# Patient Record
Sex: FEMALE | Race: WHITE | Employment: FULL TIME
[De-identification: names, ages, dates, MRNs, and addresses within clinical notes are randomized per-mention and may not be internally consistent; named-entity substitution may affect disease eponyms.]

---

## 2017-01-05 ENCOUNTER — NURSE ONLY (OUTPATIENT)
Dept: OBGYN | Facility: CLINIC | Age: 27
End: 2017-01-05

## 2017-01-05 VITALS
BODY MASS INDEX: 19.14 KG/M2 | DIASTOLIC BLOOD PRESSURE: 84 MMHG | WEIGHT: 108 LBS | HEIGHT: 63 IN | SYSTOLIC BLOOD PRESSURE: 102 MMHG

## 2017-01-05 DIAGNOSIS — Z30.42 ENCOUNTER FOR SURVEILLANCE OF INJECTABLE CONTRACEPTIVE: Primary | ICD-10-CM

## 2017-01-05 PROCEDURE — 99211 OFF/OP EST MAY X REQ PHY/QHP: CPT | Performed by: OBSTETRICS & GYNECOLOGY

## 2017-01-05 RX ORDER — MEDROXYPROGESTERONE ACETATE 150 MG/ML
150 INJECTION, SUSPENSION INTRAMUSCULAR ONCE
Status: COMPLETED | OUTPATIENT
Start: 2017-01-05 | End: 2017-01-05

## 2017-01-05 RX ADMIN — MEDROXYPROGESTERONE ACETATE 150 MG: 150 INJECTION, SUSPENSION INTRAMUSCULAR at 10:13

## 2017-01-12 ENCOUNTER — TELEPHONE (OUTPATIENT)
Dept: OBGYN | Facility: CLINIC | Age: 27
End: 2017-01-12

## 2017-01-12 DIAGNOSIS — Z11.3 ROUTINE SCREENING FOR STI (SEXUALLY TRANSMITTED INFECTION): Primary | ICD-10-CM

## 2017-04-04 ENCOUNTER — OFFICE VISIT (OUTPATIENT)
Dept: OBGYN CLINIC | Age: 27
End: 2017-04-04
Payer: MEDICARE

## 2017-04-04 VITALS
BODY MASS INDEX: 18.23 KG/M2 | DIASTOLIC BLOOD PRESSURE: 72 MMHG | SYSTOLIC BLOOD PRESSURE: 100 MMHG | WEIGHT: 106.8 LBS | HEIGHT: 64 IN

## 2017-04-04 DIAGNOSIS — Z01.419 ENCOUNTER FOR GYNECOLOGICAL EXAMINATION WITHOUT ABNORMAL FINDING: Primary | ICD-10-CM

## 2017-04-04 DIAGNOSIS — Z30.42 ENCOUNTER FOR SURVEILLANCE OF INJECTABLE CONTRACEPTIVE: ICD-10-CM

## 2017-04-04 DIAGNOSIS — N92.1 MENOMETRORRHAGIA: ICD-10-CM

## 2017-04-04 DIAGNOSIS — R63.4 UNINTENDED WEIGHT LOSS: ICD-10-CM

## 2017-04-04 LAB — PAP SMEAR: ABNORMAL

## 2017-04-04 PROCEDURE — 99395 PREV VISIT EST AGE 18-39: CPT | Performed by: NURSE PRACTITIONER

## 2017-04-04 RX ORDER — MEDROXYPROGESTERONE ACETATE 150 MG/ML
150 INJECTION, SUSPENSION INTRAMUSCULAR ONCE
Status: DISCONTINUED | OUTPATIENT
Start: 2017-04-04 | End: 2018-02-01 | Stop reason: HOSPADM

## 2017-04-04 ASSESSMENT — ENCOUNTER SYMPTOMS
DIARRHEA: 0
SHORTNESS OF BREATH: 0
ABDOMINAL DISTENTION: 0
ABDOMINAL PAIN: 0
CONSTIPATION: 0
BACK PAIN: 0
COUGH: 0

## 2017-04-17 DIAGNOSIS — Z01.419 ENCOUNTER FOR GYNECOLOGICAL EXAMINATION WITHOUT ABNORMAL FINDING: ICD-10-CM

## 2017-05-18 ENCOUNTER — HOSPITAL ENCOUNTER (OUTPATIENT)
Age: 27
Setting detail: SPECIMEN
Discharge: HOME | End: 2017-05-18
Payer: MEDICARE

## 2017-05-18 ENCOUNTER — PROCEDURE VISIT (OUTPATIENT)
Dept: OBGYN CLINIC | Age: 27
End: 2017-05-18
Payer: MEDICARE

## 2017-05-18 VITALS
BODY MASS INDEX: 18.37 KG/M2 | DIASTOLIC BLOOD PRESSURE: 60 MMHG | HEIGHT: 64 IN | SYSTOLIC BLOOD PRESSURE: 108 MMHG | WEIGHT: 107.6 LBS

## 2017-05-18 DIAGNOSIS — N92.1 MENOMETRORRHAGIA: ICD-10-CM

## 2017-05-18 DIAGNOSIS — R87.612 LOW GRADE SQUAMOUS INTRAEPITH LESION ON CYTOLOGIC SMEAR CERVIX (LGSIL): Primary | ICD-10-CM

## 2017-05-18 DIAGNOSIS — R63.4 UNINTENDED WEIGHT LOSS: ICD-10-CM

## 2017-05-18 DIAGNOSIS — B97.7 HPV IN FEMALE: ICD-10-CM

## 2017-05-18 LAB
ABSOLUTE EOS #: 0.1 K/UL (ref 0–0.4)
ABSOLUTE LYMPH #: 2.9 K/UL (ref 1–4.8)
ABSOLUTE MONO #: 0.4 K/UL (ref 0.1–1.2)
BASOPHILS # BLD: 1 %
BASOPHILS ABSOLUTE: 0 K/UL (ref 0–0.2)
DIFFERENTIAL TYPE: NORMAL
EOSINOPHILS RELATIVE PERCENT: 2 %
HCT VFR BLD CALC: 40.6 % (ref 36–46)
HEMOGLOBIN: 13.7 G/DL (ref 12–16)
LYMPHOCYTES # BLD: 47 %
MCH RBC QN AUTO: 30.1 PG (ref 26–34)
MCHC RBC AUTO-ENTMCNC: 33.8 G/DL (ref 31–37)
MCV RBC AUTO: 89.2 FL (ref 80–100)
MONOCYTES # BLD: 7 %
PDW BLD-RTO: 13.7 % (ref 12.5–15.4)
PLATELET # BLD: 212 K/UL (ref 140–450)
PLATELET ESTIMATE: NORMAL
PMV BLD AUTO: 9.9 FL (ref 6–12)
RBC # BLD: 4.55 M/UL (ref 4–5.2)
RBC # BLD: NORMAL 10*6/UL
SEG NEUTROPHILS: 43 %
SEGMENTED NEUTROPHILS ABSOLUTE COUNT: 2.5 K/UL (ref 1.8–7.7)
TSH SERPL DL<=0.05 MIU/L-ACNC: 1.33 MIU/L (ref 0.3–5)
WBC # BLD: 5.9 K/UL (ref 3.5–11)
WBC # BLD: NORMAL 10*3/UL

## 2017-05-18 PROCEDURE — 57454 BX/CURETT OF CERVIX W/SCOPE: CPT | Performed by: OBSTETRICS & GYNECOLOGY

## 2017-05-18 RX ORDER — MEDROXYPROGESTERONE ACETATE 150 MG/ML
150 INJECTION, SUSPENSION INTRAMUSCULAR
COMMUNITY
End: 2017-10-04 | Stop reason: ALTCHOICE

## 2017-05-22 LAB — SURGICAL PATHOLOGY REPORT: NORMAL

## 2017-06-05 ENCOUNTER — OFFICE VISIT (OUTPATIENT)
Dept: OBGYN CLINIC | Age: 27
End: 2017-06-05
Payer: MEDICARE

## 2017-06-05 VITALS
SYSTOLIC BLOOD PRESSURE: 98 MMHG | WEIGHT: 108 LBS | DIASTOLIC BLOOD PRESSURE: 60 MMHG | BODY MASS INDEX: 18.44 KG/M2 | HEIGHT: 64 IN

## 2017-06-05 DIAGNOSIS — N92.1 MENOMETRORRHAGIA: ICD-10-CM

## 2017-06-05 DIAGNOSIS — N87.0 MILD DYSPLASIA OF CERVIX: Primary | ICD-10-CM

## 2017-06-05 PROCEDURE — 99213 OFFICE O/P EST LOW 20 MIN: CPT | Performed by: OBSTETRICS & GYNECOLOGY

## 2017-07-13 ENCOUNTER — PROCEDURE VISIT (OUTPATIENT)
Dept: OBGYN CLINIC | Age: 27
End: 2017-07-13
Payer: MEDICARE

## 2017-07-13 DIAGNOSIS — N92.1 MENOMETRORRHAGIA: ICD-10-CM

## 2017-07-13 DIAGNOSIS — N92.1 MENOMETRORRHAGIA: Primary | ICD-10-CM

## 2017-07-13 PROCEDURE — 99212 OFFICE O/P EST SF 10 MIN: CPT | Performed by: OBSTETRICS & GYNECOLOGY

## 2017-07-13 PROCEDURE — 76830 TRANSVAGINAL US NON-OB: CPT | Performed by: OBSTETRICS & GYNECOLOGY

## 2017-07-17 ENCOUNTER — OFFICE VISIT (OUTPATIENT)
Dept: OBGYN CLINIC | Age: 27
End: 2017-07-17
Payer: MEDICARE

## 2017-07-17 VITALS
SYSTOLIC BLOOD PRESSURE: 100 MMHG | WEIGHT: 106.6 LBS | HEIGHT: 64 IN | BODY MASS INDEX: 18.2 KG/M2 | DIASTOLIC BLOOD PRESSURE: 58 MMHG

## 2017-07-17 DIAGNOSIS — N92.1 MENOMETRORRHAGIA: Primary | ICD-10-CM

## 2017-07-17 PROCEDURE — 99213 OFFICE O/P EST LOW 20 MIN: CPT | Performed by: OBSTETRICS & GYNECOLOGY

## 2017-08-04 ENCOUNTER — TELEPHONE (OUTPATIENT)
Dept: OBGYN CLINIC | Age: 27
End: 2017-08-04

## 2017-08-22 ENCOUNTER — TELEPHONE (OUTPATIENT)
Dept: OBGYN CLINIC | Age: 27
End: 2017-08-22

## 2017-08-24 ENCOUNTER — INITIAL CONSULT (OUTPATIENT)
Dept: OBGYN CLINIC | Age: 27
End: 2017-08-24
Payer: MEDICARE

## 2017-08-24 VITALS
WEIGHT: 109 LBS | HEIGHT: 64 IN | DIASTOLIC BLOOD PRESSURE: 72 MMHG | SYSTOLIC BLOOD PRESSURE: 110 MMHG | BODY MASS INDEX: 18.61 KG/M2

## 2017-08-24 DIAGNOSIS — N92.1 MENOMETRORRHAGIA: Primary | ICD-10-CM

## 2017-08-24 DIAGNOSIS — Z09 POSTOP CHECK: ICD-10-CM

## 2017-08-24 DIAGNOSIS — N80.30 ENDOMETRIOSIS OF PELVIC PERITONEUM: ICD-10-CM

## 2017-08-24 PROCEDURE — 99214 OFFICE O/P EST MOD 30 MIN: CPT | Performed by: OBSTETRICS & GYNECOLOGY

## 2017-10-04 ENCOUNTER — OFFICE VISIT (OUTPATIENT)
Dept: FAMILY MEDICINE CLINIC | Age: 27
End: 2017-10-04
Payer: MEDICARE

## 2017-10-04 VITALS
HEIGHT: 63 IN | OXYGEN SATURATION: 98 % | RESPIRATION RATE: 18 BRPM | HEART RATE: 82 BPM | BODY MASS INDEX: 18.96 KG/M2 | TEMPERATURE: 97.1 F | DIASTOLIC BLOOD PRESSURE: 62 MMHG | SYSTOLIC BLOOD PRESSURE: 118 MMHG | WEIGHT: 107 LBS

## 2017-10-04 DIAGNOSIS — Z00.00 WELL ADULT EXAM: Primary | ICD-10-CM

## 2017-10-04 DIAGNOSIS — Z23 IMMUNIZATION DUE: ICD-10-CM

## 2017-10-04 DIAGNOSIS — Z11.1 PPD SCREENING TEST: ICD-10-CM

## 2017-10-04 PROCEDURE — 86580 TB INTRADERMAL TEST: CPT | Performed by: INTERNAL MEDICINE

## 2017-10-04 PROCEDURE — 90471 IMMUNIZATION ADMIN: CPT | Performed by: INTERNAL MEDICINE

## 2017-10-04 PROCEDURE — 99395 PREV VISIT EST AGE 18-39: CPT | Performed by: INTERNAL MEDICINE

## 2017-10-04 PROCEDURE — 90686 IIV4 VACC NO PRSV 0.5 ML IM: CPT | Performed by: INTERNAL MEDICINE

## 2017-10-04 ASSESSMENT — ENCOUNTER SYMPTOMS
ABDOMINAL PAIN: 0
CONSTIPATION: 0
COUGH: 0
EYE REDNESS: 0
CHEST TIGHTNESS: 0
SHORTNESS OF BREATH: 0
SORE THROAT: 0
DIARRHEA: 0
APNEA: 0
VOMITING: 0
NAUSEA: 0
BLOOD IN STOOL: 0

## 2017-10-04 ASSESSMENT — PATIENT HEALTH QUESTIONNAIRE - PHQ9
2. FEELING DOWN, DEPRESSED OR HOPELESS: 0
SUM OF ALL RESPONSES TO PHQ9 QUESTIONS 1 & 2: 0
1. LITTLE INTEREST OR PLEASURE IN DOING THINGS: 0
SUM OF ALL RESPONSES TO PHQ QUESTIONS 1-9: 0

## 2017-10-04 NOTE — PROGRESS NOTES
Visit Information    Have you changed or started any medications since your last visit including any over-the-counter medicines, vitamins, or herbal medicines? no   Are you having any side effects from any of your medications? -  no  Have you stopped taking any of your medications? Is so, why? -  no    Have you seen any other physician or provider since your last visit? No  Have you had any other diagnostic tests since your last visit? No  Have you been seen in the emergency room and/or had an admission to a hospital since we last saw you? No  Have you had your routine dental cleaning in the past 6 months? no    Have you activated your [employer] account? If not, what are your barriers?  No: Discussed with patient     No care team member to display    Medical History Review  Past Medical, Family, and Social History reviewed and does not contribute to the patient presenting condition    Health Maintenance   Topic Date Due    DTaP/Tdap/Td vaccine (1 - Tdap) 07/18/2009    Flu vaccine (1) 09/01/2017    Cervical cancer screen  04/04/2020    HIV screen  Completed
Available lab work, tests and notes were discussed. Health maintenance was discussed. Diet, exercise, reduction in weight and salt was discussed. Range of motion exercises were discussed. Discussed use, benefit, and side effects of prescribed medications. All patient questions answered. Pt voiced understanding. Instructed to continue current medications, diet and exercise. Patient agreed with treatment plan. Follow up as directed. Patient given educational materials - see patient instructions.     Electronically signed by Luis Talley DO on 10/4/2017 at 8:24 AM

## 2017-12-21 ENCOUNTER — OFFICE VISIT (OUTPATIENT)
Dept: OBGYN CLINIC | Age: 27
End: 2017-12-21
Payer: MEDICARE

## 2017-12-21 VITALS
WEIGHT: 108 LBS | DIASTOLIC BLOOD PRESSURE: 70 MMHG | HEIGHT: 64 IN | BODY MASS INDEX: 18.44 KG/M2 | SYSTOLIC BLOOD PRESSURE: 110 MMHG

## 2017-12-21 DIAGNOSIS — N92.1 MENOMETRORRHAGIA: Primary | ICD-10-CM

## 2017-12-21 DIAGNOSIS — D25.9 UTERINE LEIOMYOMA, UNSPECIFIED LOCATION: ICD-10-CM

## 2017-12-21 DIAGNOSIS — R10.2 CHRONIC PELVIC PAIN IN FEMALE: ICD-10-CM

## 2017-12-21 DIAGNOSIS — G89.29 CHRONIC PELVIC PAIN IN FEMALE: ICD-10-CM

## 2017-12-21 PROCEDURE — G8427 DOCREV CUR MEDS BY ELIG CLIN: HCPCS | Performed by: OBSTETRICS & GYNECOLOGY

## 2017-12-21 PROCEDURE — G8484 FLU IMMUNIZE NO ADMIN: HCPCS | Performed by: OBSTETRICS & GYNECOLOGY

## 2017-12-21 PROCEDURE — 1036F TOBACCO NON-USER: CPT | Performed by: OBSTETRICS & GYNECOLOGY

## 2017-12-21 PROCEDURE — G8420 CALC BMI NORM PARAMETERS: HCPCS | Performed by: OBSTETRICS & GYNECOLOGY

## 2017-12-21 PROCEDURE — 99214 OFFICE O/P EST MOD 30 MIN: CPT | Performed by: OBSTETRICS & GYNECOLOGY

## 2017-12-21 RX ORDER — ACETAMINOPHEN AND CODEINE PHOSPHATE 300; 30 MG/1; MG/1
1-2 TABLET ORAL EVERY 6 HOURS PRN
Qty: 20 TABLET | Refills: 0 | Status: ON HOLD | OUTPATIENT
Start: 2017-12-21 | End: 2018-02-01

## 2017-12-21 NOTE — PATIENT INSTRUCTIONS
Please carefully follow all the preoperative instructions given to you. Remember not to take ibuprofen, aspirin or related products within 5 days of your scheduled surgery. Please call the office if you have any questions or concerns prior to surgery. Plan on returning to the office 1-2 weeks after hospital discharge. Bandar Skaggs and Happy New Year!

## 2017-12-21 NOTE — PROGRESS NOTES
Past Surgical History:   Procedure Laterality Date    COLPOSCOPY  2017    LORA I    SALPINGECTOMY Bilateral 2017    partial with D+C, hysteroscopy, failed NovaSure ablation     Family History   Problem Relation Age of Onset    Other Mother      problems with irreg cycles    Heart Disease Father     Diabetes Maternal Grandmother      History   Smoking Status    Former Smoker   Smokeless Tobacco    Never Used     History   Alcohol Use No     Current Outpatient Prescriptions   Medication Sig Dispense Refill    acetaminophen-codeine (TYLENOL #3) 300-30 MG per tablet Take 1-2 tablets by mouth every 6 hours as needed for Pain . 20 tablet 0     Current Facility-Administered Medications   Medication Dose Route Frequency Provider Last Rate Last Dose    medroxyPROGESTERone (DEPO-PROVERA) injection 150 mg  150 mg Intramuscular Once Penny Bauman CNP           Allergies: Allergies   Allergen Reactions    Morphine Other (See Comments)     phlebitis       Gynecologic History:  No LMP recorded. Patient is not currently having periods (Reason: Other - See Notes).   Sexually Active: Yes  STD History: Yes, HPV  Abnormal Pap History yes/LGSIL  Birth Control: Yes, BS    Obstetric History       T0      L2     SAB0   TAB0   Ectopic0   Molar0   Multiple0   Live Births2      ______________________________________________________________________  REVIEW OF SYSTEMS:        Constitutional:  Unexpected weight change no  Neurological:  Frequent headaches  no  Ophthalmic:  Recent visual changes no  ENT:   Difficulty swallowing  no  Breast:              Masses   no     Respiratory:  Shortness of breath  no    Cardiovascular: Chest pain   yes     Gastrointestinal: Chronic diarrhea/constipation no   Urogenital:  Urinary incontinence  no                                         Heavy/irregular periods           yes                                      Vaginal discharge to the proposed procedure including but not limited to: infection, hemorrhage, blood clot formation, damage to the gastrointestinal/genital urinary/neurological/vascular systems, death and failure in the proposed procedure's intent. She also understands the risks from transfusion including but not limited to: fever, allergic reaction, hepatitis B/C. and HIV. All her questions have been answered to her satisfaction. The consent has been signed for a vaginal, possible abdominal hysterectomy. Preop labs will include a CBC, type & screen, HCG and BMP. We will use Ancef IVPB for antibiotic prophylaxis and Lovenox 40 mg subcutaneous for VTE prophylaxis. She was instructed not to use NSAIDs regularly within 4-5 days of her planned surgery. We'll manage postoperative pain orally with Tylenol and codeine which she states is better tolerated. She will plan on returning to the office in 1-2 weeks postop. Re Miller MD, MPH, [facility]. [facility] [place]    Patient was seen with total face to face time of 20 minutes.

## 2018-01-23 ENCOUNTER — HOSPITAL ENCOUNTER (OUTPATIENT)
Dept: PREADMISSION TESTING | Age: 28
Discharge: HOME | End: 2018-01-23
Payer: MEDICARE

## 2018-01-23 VITALS
SYSTOLIC BLOOD PRESSURE: 104 MMHG | WEIGHT: 108.03 LBS | RESPIRATION RATE: 12 BRPM | DIASTOLIC BLOOD PRESSURE: 64 MMHG | OXYGEN SATURATION: 96 % | HEART RATE: 62 BPM | BODY MASS INDEX: 18 KG/M2 | TEMPERATURE: 98.3 F | HEIGHT: 65 IN

## 2018-01-23 LAB
ANION GAP SERPL CALCULATED.3IONS-SCNC: 14 MMOL/L (ref 9–17)
BUN BLDV-MCNC: 8 MG/DL (ref 6–20)
BUN/CREAT BLD: ABNORMAL (ref 9–20)
CALCIUM SERPL-MCNC: 9.1 MG/DL (ref 8.6–10.4)
CHLORIDE BLD-SCNC: 99 MMOL/L (ref 98–107)
CO2: 27 MMOL/L (ref 20–31)
CREAT SERPL-MCNC: 0.52 MG/DL (ref 0.5–0.9)
GFR AFRICAN AMERICAN: >60 ML/MIN
GFR NON-AFRICAN AMERICAN: >60 ML/MIN
GFR SERPL CREATININE-BSD FRML MDRD: ABNORMAL ML/MIN/{1.73_M2}
GFR SERPL CREATININE-BSD FRML MDRD: ABNORMAL ML/MIN/{1.73_M2}
GLUCOSE BLD-MCNC: 100 MG/DL (ref 70–99)
HCG QUALITATIVE: NEGATIVE
HCT VFR BLD CALC: 42.4 % (ref 36.3–47.1)
HEMOGLOBIN: 14 G/DL (ref 11.9–15.1)
MCH RBC QN AUTO: 29.9 PG (ref 25.2–33.5)
MCHC RBC AUTO-ENTMCNC: 33 G/DL (ref 28.4–34.8)
MCV RBC AUTO: 90.6 FL (ref 82.6–102.9)
NRBC AUTOMATED: 0 PER 100 WBC
PDW BLD-RTO: 12.7 % (ref 11.8–14.4)
PLATELET # BLD: 197 K/UL (ref 138–453)
PMV BLD AUTO: 11.3 FL (ref 8.1–13.5)
POTASSIUM SERPL-SCNC: 3.7 MMOL/L (ref 3.7–5.3)
RBC # BLD: 4.68 M/UL (ref 3.95–5.11)
SODIUM BLD-SCNC: 140 MMOL/L (ref 135–144)
WBC # BLD: 3.1 K/UL (ref 3.5–11.3)

## 2018-01-23 PROCEDURE — 86850 RBC ANTIBODY SCREEN: CPT

## 2018-01-23 PROCEDURE — 36415 COLL VENOUS BLD VENIPUNCTURE: CPT

## 2018-01-23 PROCEDURE — 85027 COMPLETE CBC AUTOMATED: CPT

## 2018-01-23 PROCEDURE — 80048 BASIC METABOLIC PNL TOTAL CA: CPT

## 2018-01-23 PROCEDURE — 84703 CHORIONIC GONADOTROPIN ASSAY: CPT

## 2018-01-23 PROCEDURE — 86901 BLOOD TYPING SEROLOGIC RH(D): CPT

## 2018-01-23 PROCEDURE — 86900 BLOOD TYPING SEROLOGIC ABO: CPT

## 2018-01-23 RX ORDER — OSELTAMIVIR PHOSPHATE 30 MG/1
30 CAPSULE ORAL 2 TIMES DAILY
Status: ON HOLD | COMMUNITY
End: 2018-02-01 | Stop reason: HOSPADM

## 2018-01-23 RX ORDER — SODIUM CHLORIDE, SODIUM LACTATE, POTASSIUM CHLORIDE, CALCIUM CHLORIDE 600; 310; 30; 20 MG/100ML; MG/100ML; MG/100ML; MG/100ML
1000 INJECTION, SOLUTION INTRAVENOUS CONTINUOUS
Status: CANCELLED | OUTPATIENT
Start: 2018-01-23

## 2018-01-23 NOTE — PROGRESS NOTES
Anesthesia Focused Assessment    STOP-BANG Sleep Apnea Questionnaire    SNORE loudly (heard through closed doors)? No  TIRED, fatigued, sleepy during daytime? No  OBSERVED stopping breathing during sleep? No  High blood PRESSURE being treated? No    BMI over 35? No  AGE over 48? No  NECK circumference over 16\"? No  GENDER (male)? No             Total 0  High risk 5-8  Intermediate risk 3-4  Low risk 0-2    Obstructive Sleep Apnea: no  If YES, machine used: no     Type 1 DM:   no  T2DM:  no    Coronary Artery Disease:  no  Hypertension:  no    Active smoker:  Less than 1/2 ppd for 10 years, quit 2016. Drinks Alcohol:  no    Dentition: benign    Defib / AICD / Pacemaker: no      Renal Failure/dialysis:  no    Patient was evaluated in PAT & anesthesia guidelines were applied. NPO guidelines, medication instructions and scheduled arrival time were reviewed with patient. Hx of anesthesia complications:  PONV;  Prolonged emergence from general anesthesia. Anxious-requests versed. Family hx of anesthesia complications:  no                                                                                                                     Anesthesia contacted:   Yes (patient was recommended to follow up with cardiology for Holter Monitor but did not yet. No clearance requested per anesthesia-Dr. Thien Arellano). Medical or cardiac clearance ordered: patient saw cardiology while inpatient 8/2017, syncope and bradycardia (testing in epic and paper chart). Pulse was 62 at PAT appointment today. PATIENT CURRENTLY IS BEING TREATED FOR INFLUENZA WITH TAMIFLU, DAY 4. SHE DOES NOT HAVE A PCP, TREATED BY URGENT CARE. PATIENT WAS TOLD THAT SHE SHOULD CALL HER SURGEON Monday IF SHE IS NOT FEELING BACK TO NORMAL FOR HER SURGERY ON Wednesday. PATIENT UNDERSTANDS AND AGREES THAT SHE WILL.     Shahid Dan PA-C  1/23/18  11:33 AM

## 2018-01-29 ENCOUNTER — TELEPHONE (OUTPATIENT)
Dept: OBGYN CLINIC | Age: 28
End: 2018-01-29

## 2018-01-29 NOTE — TELEPHONE ENCOUNTER
[facility] and asked how she was doing she stated is few feels great no symptoms so she is still planning on having surgery on Wednesday

## 2018-01-30 ENCOUNTER — ANESTHESIA EVENT (OUTPATIENT)
Dept: OPERATING ROOM | Age: 28
End: 2018-01-30
Payer: MEDICARE

## 2018-01-30 PROBLEM — Z87.42 HISTORY OF ENDOMETRIOSIS: Status: ACTIVE | Noted: 2018-01-30

## 2018-01-30 PROBLEM — Z87.42 H/O ABNORMAL CERVICAL PAPANICOLAOU SMEAR: Status: ACTIVE | Noted: 2018-01-30

## 2018-01-30 PROBLEM — D25.9 UTERINE FIBROID: Status: ACTIVE | Noted: 2018-01-30

## 2018-01-30 PROBLEM — N92.0 MENORRHAGIA: Status: ACTIVE | Noted: 2018-01-30

## 2018-01-30 PROBLEM — R00.1 BRADYCARDIA: Status: ACTIVE | Noted: 2018-01-30

## 2018-01-30 PROBLEM — Z98.51 HISTORY OF BILATERAL TUBAL LIGATION: Status: ACTIVE | Noted: 2018-01-30

## 2018-01-30 PROBLEM — Z98.890 HISTORY OF ENDOMETRIAL ABLATION: Status: ACTIVE | Noted: 2018-01-30

## 2018-01-30 PROBLEM — M54.9 CHRONIC BACK PAIN: Status: ACTIVE | Noted: 2018-01-30

## 2018-01-30 PROBLEM — N92.1 MENOMETRORRHAGIA: Status: ACTIVE | Noted: 2018-01-30

## 2018-01-30 PROBLEM — R10.2 PELVIC PAIN IN FEMALE: Status: ACTIVE | Noted: 2018-01-30

## 2018-01-30 PROBLEM — G89.29 CHRONIC BACK PAIN: Status: ACTIVE | Noted: 2018-01-30

## 2018-01-30 PROBLEM — Z87.410 HISTORY OF CERVICAL DYSPLASIA: Status: ACTIVE | Noted: 2018-01-30

## 2018-01-31 ENCOUNTER — HOSPITAL ENCOUNTER (OUTPATIENT)
Age: 28
Setting detail: OBSERVATION
Discharge: HOME | End: 2018-02-01
Attending: OBSTETRICS & GYNECOLOGY | Admitting: OBSTETRICS & GYNECOLOGY
Payer: MEDICARE

## 2018-01-31 ENCOUNTER — ANESTHESIA (OUTPATIENT)
Dept: OPERATING ROOM | Age: 28
End: 2018-01-31
Payer: MEDICARE

## 2018-01-31 VITALS
OXYGEN SATURATION: 100 % | TEMPERATURE: 96.1 F | SYSTOLIC BLOOD PRESSURE: 92 MMHG | RESPIRATION RATE: 7 BRPM | DIASTOLIC BLOOD PRESSURE: 61 MMHG

## 2018-01-31 DIAGNOSIS — Z90.710 S/P TOTAL HYSTERECTOMY: Primary | ICD-10-CM

## 2018-01-31 DIAGNOSIS — G89.18 POSTOPERATIVE PAIN: ICD-10-CM

## 2018-01-31 LAB
ABO/RH: NORMAL
ANTIBODY SCREEN: NEGATIVE
ARM BAND NUMBER: NORMAL
EXPIRATION DATE: NORMAL

## 2018-01-31 PROCEDURE — 3700000000 HC ANESTHESIA ATTENDED CARE: Performed by: OBSTETRICS & GYNECOLOGY

## 2018-01-31 PROCEDURE — 96372 THER/PROPH/DIAG INJ SC/IM: CPT

## 2018-01-31 PROCEDURE — 6360000002 HC RX W HCPCS: Performed by: OBSTETRICS & GYNECOLOGY

## 2018-01-31 PROCEDURE — 2580000003 HC RX 258: Performed by: ANESTHESIOLOGY

## 2018-01-31 PROCEDURE — 6370000000 HC RX 637 (ALT 250 FOR IP): Performed by: OBSTETRICS & GYNECOLOGY

## 2018-01-31 PROCEDURE — 2500000003 HC RX 250 WO HCPCS: Performed by: NURSE ANESTHETIST, CERTIFIED REGISTERED

## 2018-01-31 PROCEDURE — 96374 THER/PROPH/DIAG INJ IV PUSH: CPT

## 2018-01-31 PROCEDURE — 2780000010 HC IMPLANT OTHER: Performed by: OBSTETRICS & GYNECOLOGY

## 2018-01-31 PROCEDURE — 6360000002 HC RX W HCPCS: Performed by: NURSE ANESTHETIST, CERTIFIED REGISTERED

## 2018-01-31 PROCEDURE — 87086 URINE CULTURE/COLONY COUNT: CPT

## 2018-01-31 PROCEDURE — 94762 N-INVAS EAR/PLS OXIMTRY CONT: CPT

## 2018-01-31 PROCEDURE — 6360000002 HC RX W HCPCS

## 2018-01-31 PROCEDURE — 3600000014 HC SURGERY LEVEL 4 ADDTL 15MIN: Performed by: OBSTETRICS & GYNECOLOGY

## 2018-01-31 PROCEDURE — 3700000001 HC ADD 15 MINUTES (ANESTHESIA): Performed by: OBSTETRICS & GYNECOLOGY

## 2018-01-31 PROCEDURE — 2580000003 HC RX 258: Performed by: OBSTETRICS & GYNECOLOGY

## 2018-01-31 PROCEDURE — 96375 TX/PRO/DX INJ NEW DRUG ADDON: CPT

## 2018-01-31 PROCEDURE — 6370000000 HC RX 637 (ALT 250 FOR IP): Performed by: ANESTHESIOLOGY

## 2018-01-31 PROCEDURE — 7100000001 HC PACU RECOVERY - ADDTL 15 MIN: Performed by: OBSTETRICS & GYNECOLOGY

## 2018-01-31 PROCEDURE — 58260 VAGINAL HYSTERECTOMY: CPT | Performed by: OBSTETRICS & GYNECOLOGY

## 2018-01-31 PROCEDURE — 64488 TAP BLOCK BI INJECTION: CPT | Performed by: ANESTHESIOLOGY

## 2018-01-31 PROCEDURE — 6360000002 HC RX W HCPCS: Performed by: ANESTHESIOLOGY

## 2018-01-31 PROCEDURE — 88307 TISSUE EXAM BY PATHOLOGIST: CPT

## 2018-01-31 PROCEDURE — G0378 HOSPITAL OBSERVATION PER HR: HCPCS

## 2018-01-31 PROCEDURE — 2500000003 HC RX 250 WO HCPCS

## 2018-01-31 PROCEDURE — 96376 TX/PRO/DX INJ SAME DRUG ADON: CPT

## 2018-01-31 PROCEDURE — 7100000000 HC PACU RECOVERY - FIRST 15 MIN: Performed by: OBSTETRICS & GYNECOLOGY

## 2018-01-31 PROCEDURE — 3600000004 HC SURGERY LEVEL 4 BASE: Performed by: OBSTETRICS & GYNECOLOGY

## 2018-01-31 DEVICE — AGENT HEMSTAT 3GM OXIDIZED REGENERATED CELOS ABSRB FOR CONT (ORDER MULTIPLES OF 5EA): Type: IMPLANTABLE DEVICE | Status: FUNCTIONAL

## 2018-01-31 RX ORDER — MIDAZOLAM HYDROCHLORIDE 1 MG/ML
2 INJECTION INTRAMUSCULAR; INTRAVENOUS ONCE
Status: COMPLETED | OUTPATIENT
Start: 2018-01-31 | End: 2018-01-31

## 2018-01-31 RX ORDER — ROCURONIUM BROMIDE 10 MG/ML
INJECTION, SOLUTION INTRAVENOUS PRN
Status: DISCONTINUED | OUTPATIENT
Start: 2018-01-31 | End: 2018-01-31 | Stop reason: SDUPTHER

## 2018-01-31 RX ORDER — KETOROLAC TROMETHAMINE 30 MG/ML
INJECTION, SOLUTION INTRAMUSCULAR; INTRAVENOUS PRN
Status: DISCONTINUED | OUTPATIENT
Start: 2018-01-31 | End: 2018-01-31 | Stop reason: SDUPTHER

## 2018-01-31 RX ORDER — PROMETHAZINE HYDROCHLORIDE 25 MG/ML
6.25 INJECTION, SOLUTION INTRAMUSCULAR; INTRAVENOUS
Status: DISCONTINUED | OUTPATIENT
Start: 2018-01-31 | End: 2018-01-31 | Stop reason: HOSPADM

## 2018-01-31 RX ORDER — NEOSTIGMINE METHYLSULFATE 1 MG/ML
INJECTION, SOLUTION INTRAVENOUS PRN
Status: DISCONTINUED | OUTPATIENT
Start: 2018-01-31 | End: 2018-01-31 | Stop reason: SDUPTHER

## 2018-01-31 RX ORDER — DIPHENHYDRAMINE HYDROCHLORIDE 50 MG/ML
INJECTION INTRAMUSCULAR; INTRAVENOUS PRN
Status: DISCONTINUED | OUTPATIENT
Start: 2018-01-31 | End: 2018-01-31 | Stop reason: SDUPTHER

## 2018-01-31 RX ORDER — LIDOCAINE HYDROCHLORIDE 10 MG/ML
1 INJECTION, SOLUTION EPIDURAL; INFILTRATION; INTRACAUDAL; PERINEURAL
Status: DISCONTINUED | OUTPATIENT
Start: 2018-01-31 | End: 2018-01-31 | Stop reason: HOSPADM

## 2018-01-31 RX ORDER — ACETAMINOPHEN AND CODEINE PHOSPHATE 300; 30 MG/1; MG/1
2 TABLET ORAL EVERY 6 HOURS PRN
Status: DISCONTINUED | OUTPATIENT
Start: 2018-01-31 | End: 2018-02-01 | Stop reason: HOSPADM

## 2018-01-31 RX ORDER — SCOLOPAMINE TRANSDERMAL SYSTEM 1 MG/1
1 PATCH, EXTENDED RELEASE TRANSDERMAL ONCE
Status: DISCONTINUED | OUTPATIENT
Start: 2018-01-31 | End: 2018-02-01 | Stop reason: HOSPADM

## 2018-01-31 RX ORDER — ONDANSETRON 2 MG/ML
4 INJECTION INTRAMUSCULAR; INTRAVENOUS EVERY 6 HOURS PRN
Status: DISCONTINUED | OUTPATIENT
Start: 2018-01-31 | End: 2018-02-01 | Stop reason: HOSPADM

## 2018-01-31 RX ORDER — FENTANYL CITRATE 50 UG/ML
INJECTION, SOLUTION INTRAMUSCULAR; INTRAVENOUS PRN
Status: DISCONTINUED | OUTPATIENT
Start: 2018-01-31 | End: 2018-01-31 | Stop reason: SDUPTHER

## 2018-01-31 RX ORDER — APREPITANT 40 MG/1
40 CAPSULE ORAL ONCE
Status: COMPLETED | OUTPATIENT
Start: 2018-01-31 | End: 2018-01-31

## 2018-01-31 RX ORDER — SODIUM CHLORIDE, SODIUM LACTATE, POTASSIUM CHLORIDE, CALCIUM CHLORIDE 600; 310; 30; 20 MG/100ML; MG/100ML; MG/100ML; MG/100ML
INJECTION, SOLUTION INTRAVENOUS CONTINUOUS
Status: DISCONTINUED | OUTPATIENT
Start: 2018-01-31 | End: 2018-01-31

## 2018-01-31 RX ORDER — FENTANYL CITRATE 50 UG/ML
INJECTION, SOLUTION INTRAMUSCULAR; INTRAVENOUS
Status: COMPLETED
Start: 2018-01-31 | End: 2018-01-31

## 2018-01-31 RX ORDER — PROMETHAZINE HYDROCHLORIDE 25 MG/ML
6.25 INJECTION, SOLUTION INTRAMUSCULAR; INTRAVENOUS EVERY 6 HOURS PRN
Status: DISCONTINUED | OUTPATIENT
Start: 2018-01-31 | End: 2018-02-01 | Stop reason: HOSPADM

## 2018-01-31 RX ORDER — FENTANYL CITRATE 50 UG/ML
100 INJECTION, SOLUTION INTRAMUSCULAR; INTRAVENOUS ONCE
Status: COMPLETED | OUTPATIENT
Start: 2018-01-31 | End: 2018-01-31

## 2018-01-31 RX ORDER — SODIUM CHLORIDE 9 MG/ML
INJECTION, SOLUTION INTRAVENOUS CONTINUOUS
Status: DISCONTINUED | OUTPATIENT
Start: 2018-01-31 | End: 2018-02-01

## 2018-01-31 RX ORDER — ONDANSETRON 2 MG/ML
INJECTION INTRAMUSCULAR; INTRAVENOUS PRN
Status: DISCONTINUED | OUTPATIENT
Start: 2018-01-31 | End: 2018-01-31 | Stop reason: SDUPTHER

## 2018-01-31 RX ORDER — KETOROLAC TROMETHAMINE 30 MG/ML
30 INJECTION, SOLUTION INTRAMUSCULAR; INTRAVENOUS EVERY 6 HOURS PRN
Status: DISCONTINUED | OUTPATIENT
Start: 2018-01-31 | End: 2018-02-01 | Stop reason: HOSPADM

## 2018-01-31 RX ORDER — LIDOCAINE HYDROCHLORIDE 10 MG/ML
INJECTION, SOLUTION EPIDURAL; INFILTRATION; INTRACAUDAL; PERINEURAL PRN
Status: DISCONTINUED | OUTPATIENT
Start: 2018-01-31 | End: 2018-01-31 | Stop reason: SDUPTHER

## 2018-01-31 RX ORDER — ACETAMINOPHEN AND CODEINE PHOSPHATE 300; 30 MG/1; MG/1
1 TABLET ORAL EVERY 6 HOURS PRN
Status: DISCONTINUED | OUTPATIENT
Start: 2018-01-31 | End: 2018-02-01 | Stop reason: HOSPADM

## 2018-01-31 RX ORDER — SODIUM CHLORIDE 0.9 % (FLUSH) 0.9 %
10 SYRINGE (ML) INJECTION PRN
Status: DISCONTINUED | OUTPATIENT
Start: 2018-01-31 | End: 2018-02-01 | Stop reason: HOSPADM

## 2018-01-31 RX ORDER — SODIUM CHLORIDE, SODIUM LACTATE, POTASSIUM CHLORIDE, CALCIUM CHLORIDE 600; 310; 30; 20 MG/100ML; MG/100ML; MG/100ML; MG/100ML
1000 INJECTION, SOLUTION INTRAVENOUS CONTINUOUS
Status: DISCONTINUED | OUTPATIENT
Start: 2018-01-31 | End: 2018-01-31

## 2018-01-31 RX ORDER — DOCUSATE SODIUM 100 MG/1
100 CAPSULE, LIQUID FILLED ORAL 2 TIMES DAILY
Status: DISCONTINUED | OUTPATIENT
Start: 2018-01-31 | End: 2018-02-01 | Stop reason: HOSPADM

## 2018-01-31 RX ORDER — DIPHENHYDRAMINE HYDROCHLORIDE 50 MG/ML
12.5 INJECTION INTRAMUSCULAR; INTRAVENOUS
Status: DISCONTINUED | OUTPATIENT
Start: 2018-01-31 | End: 2018-01-31 | Stop reason: HOSPADM

## 2018-01-31 RX ORDER — MAGNESIUM HYDROXIDE 1200 MG/15ML
LIQUID ORAL CONTINUOUS PRN
Status: DISCONTINUED | OUTPATIENT
Start: 2018-01-31 | End: 2018-01-31 | Stop reason: HOSPADM

## 2018-01-31 RX ORDER — PROMETHAZINE HYDROCHLORIDE 25 MG/ML
12.5 INJECTION, SOLUTION INTRAMUSCULAR; INTRAVENOUS ONCE
Status: COMPLETED | OUTPATIENT
Start: 2018-01-31 | End: 2018-01-31

## 2018-01-31 RX ORDER — DEXAMETHASONE SODIUM PHOSPHATE 10 MG/ML
INJECTION INTRAMUSCULAR; INTRAVENOUS PRN
Status: DISCONTINUED | OUTPATIENT
Start: 2018-01-31 | End: 2018-01-31 | Stop reason: SDUPTHER

## 2018-01-31 RX ORDER — ONDANSETRON 4 MG/1
4 TABLET, FILM COATED ORAL EVERY 8 HOURS PRN
Status: DISCONTINUED | OUTPATIENT
Start: 2018-01-31 | End: 2018-02-01 | Stop reason: HOSPADM

## 2018-01-31 RX ORDER — GLYCOPYRROLATE 0.2 MG/ML
INJECTION INTRAMUSCULAR; INTRAVENOUS PRN
Status: DISCONTINUED | OUTPATIENT
Start: 2018-01-31 | End: 2018-01-31 | Stop reason: SDUPTHER

## 2018-01-31 RX ORDER — SIMETHICONE 80 MG
80 TABLET,CHEWABLE ORAL EVERY 6 HOURS PRN
Status: DISCONTINUED | OUTPATIENT
Start: 2018-01-31 | End: 2018-02-01 | Stop reason: HOSPADM

## 2018-01-31 RX ORDER — PROPOFOL 10 MG/ML
INJECTION, EMULSION INTRAVENOUS PRN
Status: DISCONTINUED | OUTPATIENT
Start: 2018-01-31 | End: 2018-01-31 | Stop reason: SDUPTHER

## 2018-01-31 RX ORDER — ROPIVACAINE HYDROCHLORIDE 5 MG/ML
40 INJECTION, SOLUTION EPIDURAL; INFILTRATION; PERINEURAL ONCE
Status: COMPLETED | OUTPATIENT
Start: 2018-01-31 | End: 2018-01-31

## 2018-01-31 RX ADMIN — DIPHENHYDRAMINE HYDROCHLORIDE 12.5 MG: 50 INJECTION, SOLUTION INTRAMUSCULAR; INTRAVENOUS at 08:24

## 2018-01-31 RX ADMIN — GLYCOPYRROLATE 0.4 MG: 0.2 INJECTION INTRAMUSCULAR; INTRAVENOUS at 09:36

## 2018-01-31 RX ADMIN — FENTANYL CITRATE 50 MCG: 50 INJECTION INTRAMUSCULAR; INTRAVENOUS at 09:24

## 2018-01-31 RX ADMIN — APREPITANT 40 MG: 40 CAPSULE ORAL at 07:18

## 2018-01-31 RX ADMIN — LIDOCAINE HYDROCHLORIDE 50 MG: 10 INJECTION, SOLUTION EPIDURAL; INFILTRATION; INTRACAUDAL; PERINEURAL at 08:09

## 2018-01-31 RX ADMIN — NEOSTIGMINE METHYLSULFATE 2.5 MG: 1 INJECTION, SOLUTION INTRAVENOUS at 09:36

## 2018-01-31 RX ADMIN — ONDANSETRON 4 MG: 2 INJECTION INTRAMUSCULAR; INTRAVENOUS at 09:15

## 2018-01-31 RX ADMIN — SODIUM CHLORIDE: 9 INJECTION, SOLUTION INTRAVENOUS at 23:07

## 2018-01-31 RX ADMIN — PROMETHAZINE HYDROCHLORIDE 12.5 MG: 25 INJECTION INTRAMUSCULAR; INTRAVENOUS at 12:12

## 2018-01-31 RX ADMIN — ROPIVACAINE HYDROCHLORIDE 40 ML: 5 INJECTION, SOLUTION EPIDURAL; INFILTRATION; PERINEURAL at 07:48

## 2018-01-31 RX ADMIN — ENOXAPARIN SODIUM 40 MG: 40 INJECTION SUBCUTANEOUS at 07:02

## 2018-01-31 RX ADMIN — FENTANYL CITRATE 100 MCG: 50 INJECTION INTRAMUSCULAR; INTRAVENOUS at 08:42

## 2018-01-31 RX ADMIN — DOCUSATE SODIUM 100 MG: 100 CAPSULE ORAL at 21:13

## 2018-01-31 RX ADMIN — KETOROLAC TROMETHAMINE 30 MG: 30 INJECTION, SOLUTION INTRAMUSCULAR at 23:01

## 2018-01-31 RX ADMIN — FENTANYL CITRATE 100 MCG: 50 INJECTION, SOLUTION INTRAMUSCULAR; INTRAVENOUS at 07:42

## 2018-01-31 RX ADMIN — MIDAZOLAM HYDROCHLORIDE 2 MG: 1 INJECTION, SOLUTION INTRAMUSCULAR; INTRAVENOUS at 07:42

## 2018-01-31 RX ADMIN — ONDANSETRON 4 MG: 2 INJECTION INTRAMUSCULAR; INTRAVENOUS at 18:22

## 2018-01-31 RX ADMIN — SODIUM CHLORIDE: 9 INJECTION, SOLUTION INTRAVENOUS at 12:17

## 2018-01-31 RX ADMIN — KETOROLAC TROMETHAMINE 30 MG: 30 INJECTION, SOLUTION INTRAMUSCULAR; INTRAVENOUS at 09:57

## 2018-01-31 RX ADMIN — HYDROMORPHONE HYDROCHLORIDE 0.25 MG: 1 INJECTION, SOLUTION INTRAMUSCULAR; INTRAVENOUS; SUBCUTANEOUS at 21:10

## 2018-01-31 RX ADMIN — ROCURONIUM BROMIDE 50 MG: 10 INJECTION INTRAVENOUS at 08:09

## 2018-01-31 RX ADMIN — SODIUM CHLORIDE, POTASSIUM CHLORIDE, SODIUM LACTATE AND CALCIUM CHLORIDE: 600; 310; 30; 20 INJECTION, SOLUTION INTRAVENOUS at 09:28

## 2018-01-31 RX ADMIN — FENTANYL CITRATE 100 MCG: 50 INJECTION INTRAMUSCULAR; INTRAVENOUS at 08:08

## 2018-01-31 RX ADMIN — HYDROMORPHONE HYDROCHLORIDE 0.5 MG: 1 INJECTION, SOLUTION INTRAMUSCULAR; INTRAVENOUS; SUBCUTANEOUS at 14:11

## 2018-01-31 RX ADMIN — PROPOFOL 200 MG: 10 INJECTION, EMULSION INTRAVENOUS at 08:09

## 2018-01-31 RX ADMIN — HYDROMORPHONE HYDROCHLORIDE 0.5 MG: 1 INJECTION, SOLUTION INTRAMUSCULAR; INTRAVENOUS; SUBCUTANEOUS at 12:09

## 2018-01-31 RX ADMIN — HYDROMORPHONE HYDROCHLORIDE 0.25 MG: 1 INJECTION, SOLUTION INTRAMUSCULAR; INTRAVENOUS; SUBCUTANEOUS at 11:00

## 2018-01-31 RX ADMIN — KETOROLAC TROMETHAMINE 30 MG: 30 INJECTION, SOLUTION INTRAMUSCULAR at 16:46

## 2018-01-31 RX ADMIN — ACETAMINOPHEN AND CODEINE PHOSPHATE 2 TABLET: 30; 300 TABLET ORAL at 23:02

## 2018-01-31 RX ADMIN — ENOXAPARIN SODIUM 40 MG: 40 INJECTION SUBCUTANEOUS at 17:54

## 2018-01-31 RX ADMIN — Medication 2 G: at 08:25

## 2018-01-31 RX ADMIN — SODIUM CHLORIDE, POTASSIUM CHLORIDE, SODIUM LACTATE AND CALCIUM CHLORIDE 1000 ML: 600; 310; 30; 20 INJECTION, SOLUTION INTRAVENOUS at 06:54

## 2018-01-31 RX ADMIN — ACETAMINOPHEN AND CODEINE PHOSPHATE 2 TABLET: 30; 300 TABLET ORAL at 15:44

## 2018-01-31 RX ADMIN — DEXAMETHASONE SODIUM PHOSPHATE 10 MG: 10 INJECTION INTRAMUSCULAR; INTRAVENOUS at 08:24

## 2018-01-31 ASSESSMENT — PULMONARY FUNCTION TESTS
PIF_VALUE: 13
PIF_VALUE: 17
PIF_VALUE: 15
PIF_VALUE: 13
PIF_VALUE: 13
PIF_VALUE: 14
PIF_VALUE: 10
PIF_VALUE: 14
PIF_VALUE: 15
PIF_VALUE: 14
PIF_VALUE: 2
PIF_VALUE: 20
PIF_VALUE: 13
PIF_VALUE: 15
PIF_VALUE: 13
PIF_VALUE: 14
PIF_VALUE: 9
PIF_VALUE: 13
PIF_VALUE: 13
PIF_VALUE: 23
PIF_VALUE: 14
PIF_VALUE: 13
PIF_VALUE: 14
PIF_VALUE: 13
PIF_VALUE: 15
PIF_VALUE: 16
PIF_VALUE: 15
PIF_VALUE: 13
PIF_VALUE: 14
PIF_VALUE: 13
PIF_VALUE: 9
PIF_VALUE: 19
PIF_VALUE: 13
PIF_VALUE: 14
PIF_VALUE: 13
PIF_VALUE: 3
PIF_VALUE: 13
PIF_VALUE: 17
PIF_VALUE: 13
PIF_VALUE: 16
PIF_VALUE: 2
PIF_VALUE: 8
PIF_VALUE: 13
PIF_VALUE: 12
PIF_VALUE: 13
PIF_VALUE: 14
PIF_VALUE: 10
PIF_VALUE: 14
PIF_VALUE: 13
PIF_VALUE: 14
PIF_VALUE: 13
PIF_VALUE: 14
PIF_VALUE: 19
PIF_VALUE: 13
PIF_VALUE: 13
PIF_VALUE: 15
PIF_VALUE: 13
PIF_VALUE: 14
PIF_VALUE: 13
PIF_VALUE: 13
PIF_VALUE: 14
PIF_VALUE: 1
PIF_VALUE: 13
PIF_VALUE: 1
PIF_VALUE: 13
PIF_VALUE: 22
PIF_VALUE: 13
PIF_VALUE: 14
PIF_VALUE: 13
PIF_VALUE: 1
PIF_VALUE: 14
PIF_VALUE: 10
PIF_VALUE: 24
PIF_VALUE: 14
PIF_VALUE: 19
PIF_VALUE: 13
PIF_VALUE: 13
PIF_VALUE: 14
PIF_VALUE: 13
PIF_VALUE: 13
PIF_VALUE: 19
PIF_VALUE: 13
PIF_VALUE: 13

## 2018-01-31 ASSESSMENT — PAIN DESCRIPTION - DESCRIPTORS
DESCRIPTORS: SHARP
DESCRIPTORS: STABBING

## 2018-01-31 ASSESSMENT — PAIN SCALES - GENERAL
PAINLEVEL_OUTOF10: 0
PAINLEVEL_OUTOF10: 10
PAINLEVEL_OUTOF10: 10
PAINLEVEL_OUTOF10: 5
PAINLEVEL_OUTOF10: 10
PAINLEVEL_OUTOF10: 10
PAINLEVEL_OUTOF10: 0
PAINLEVEL_OUTOF10: 7
PAINLEVEL_OUTOF10: 8
PAINLEVEL_OUTOF10: 0
PAINLEVEL_OUTOF10: 10

## 2018-01-31 ASSESSMENT — PAIN DESCRIPTION - PROGRESSION
CLINICAL_PROGRESSION: GRADUALLY WORSENING
CLINICAL_PROGRESSION: GRADUALLY WORSENING

## 2018-01-31 ASSESSMENT — PAIN DESCRIPTION - PAIN TYPE
TYPE: ACUTE PAIN
TYPE: SURGICAL PAIN

## 2018-01-31 ASSESSMENT — PAIN DESCRIPTION - LOCATION
LOCATION: PERINEUM
LOCATION: ABDOMEN

## 2018-01-31 ASSESSMENT — PAIN DESCRIPTION - ORIENTATION: ORIENTATION: MID;LOWER

## 2018-01-31 ASSESSMENT — LIFESTYLE VARIABLES: SMOKING_STATUS: 1

## 2018-01-31 ASSESSMENT — PAIN DESCRIPTION - ONSET: ONSET: AWAKENED FROM SLEEP

## 2018-01-31 ASSESSMENT — PAIN - FUNCTIONAL ASSESSMENT: PAIN_FUNCTIONAL_ASSESSMENT: 0-10

## 2018-01-31 ASSESSMENT — ENCOUNTER SYMPTOMS: SHORTNESS OF BREATH: 0

## 2018-01-31 ASSESSMENT — PAIN DESCRIPTION - FREQUENCY: FREQUENCY: CONTINUOUS

## 2018-01-31 NOTE — ANESTHESIA POSTPROCEDURE EVALUATION
Department of Anesthesiology  Postprocedure Note    Patient: Boni Tobias  MRN: [id number]  YOB: 1990  Date of evaluation: 1/31/2018  Time:  4:28 PM     Procedure Summary     Date:  01/31/18 Room / Location:  [address] OR    Anesthesia Start:  0805 Anesthesia Stop:  1010    Procedure:  HYSTERECTOMY VAGINAL AND REVISION OF HYMEN TEAR (N/A ) Diagnosis:  (ABNORMAL UTERINE BLEEDING, HEAVY IRREGULAR MENSES, FAILED CONSERVATIVE THERAPY, CHRONIC PAIN )    Surgeon:  Teodoro Martinez MD Responsible Provider:  Niki Mendenhall MD    Anesthesia Type:  general, regional ASA Status:  2          Anesthesia Type: general, regional    Meghan Phase I: Meghan Score: 10    Meghan Phase II:      Last vitals: Reviewed and per EMR flowsheets.        Anesthesia Post Evaluation    Patient location during evaluation: PACU  Patient participation: complete - patient participated  Level of consciousness: awake and alert  Pain score: 2  Airway patency: patent  Nausea & Vomiting: no nausea and no vomiting  Complications: no  Cardiovascular status: hemodynamically stable  Respiratory status: acceptable  Hydration status: euvolemic

## 2018-01-31 NOTE — H&P
partial with D+C, hysteroscopy, failed NovaSure ablation             Family History   Problem Relation Age of Onset    Other Mother         problems with irreg cycles    Heart Disease Father      Diabetes Maternal Grandmother            History   Smoking Status    Former Smoker   Smokeless Tobacco    Never Used          History   Alcohol Use No             Current Outpatient Prescriptions   Medication Sig Dispense Refill    acetaminophen-codeine (TYLENOL #3) 300-30 MG per tablet Take 1-2 tablets by mouth every 6 hours as needed for Pain . 20 tablet 0                Current Facility-Administered Medications   Medication Dose Route Frequency Provider Last Rate Last Dose    medroxyPROGESTERone (DEPO-PROVERA) injection 150 mg  150 mg Intramuscular Once Eudelia Saliva, CNP             Allergies: Allergies   Allergen Reactions    Morphine Other (See Comments)       phlebitis         Gynecologic History:  No LMP recorded. Patient is not currently having periods (Reason: Other - See Notes).   Sexually Active: Yes  STD History: Yes, HPV  Abnormal Pap History yes/LGSIL  Birth Control: Yes, BS     Obstetric History       T0      L2     SAB0   TAB0   Ectopic0   Molar0   Multiple0   Live Births2       ______________________________________________________________________  REVIEW OF SYSTEMS:        Constitutional:             Unexpected weight change    no  Neurological:               Frequent headaches               no  Ophthalmic:                 Recent visual changes           no  ENT:                            Difficulty swallowing                no  Breast:                         Masses                                   no                                  Respiratory:                 Shortness of breath                no                      Cardiovascular:           Chest pain                               yes                                 Gastrointestinal:          Chronic abnormal masses bilaterally. First to second-degree prolapse noted.     Extremities:  FROM and nontender without clubbing cyanosis or edema. ASSESSMENT:         1. Menometrorrhagia      2. Uterine leiomyoma, unspecified location      3. Chronic pelvic pain in female         4. Failed conservative management. PLAN:     Proper informed consent was done, alternatives were discussed   and she understands the surgical risks to the proposed procedure including but not limited to: infection, hemorrhage, blood clot formation, damage to the gastrointestinal/genital urinary/neurological/vascular systems, death and failure in the proposed procedure's intent. She also understands the risks from transfusion including but not limited to: fever, allergic reaction, hepatitis B/C. and HIV. All her questions have been answered to her satisfaction. The consent has been signed for a vaginal, possible abdominal hysterectomy. Preop labs will include a CBC, type & screen, HCG and BMP. We will use Ancef IVPB for antibiotic prophylaxis and Lovenox 40 mg subcutaneous for VTE prophylaxis. She was instructed not to use NSAIDs regularly within 4-5 days of her planned surgery. We'll manage postoperative pain orally with Tylenol and codeine which she states is better tolerated. She will plan on returning to the office in 1-2 weeks postop.     Delfino Magana MD, MPH, [facility]  [place]

## 2018-01-31 NOTE — DISCHARGE SUMMARY
per tablet  Commonly known as:  TYLENOL #3  Take 1-2 tablets by mouth every 6 hours as needed for Pain for up to 7 days. STOP taking these medications    oseltamivir 30 MG capsule  Commonly known as:  TAMIFLU           Where to Get Your Medications      You can get these medications from any pharmacy    Bring a paper prescription for each of these medications  · acetaminophen-codeine 300-30 MG per tablet  · docusate 100 MG Caps  · ibuprofen 800 MG tablet  · simethicone 80 MG chewable tablet           Activity: pelvic rest x6 weeks, no driving on narcotics, no lifting greater than 10 lbs  Diet: regular diet  Follow up: 1-2 weeks     Condition on discharge: good and stable   Discharge Date: 2/1/2018     Comments:  Home care, Follow-up care, restrictions reviewed.     Carmen Herrera So, DO  Ob/Gyn Resident  [facility], [place]  2/1/2018, 6:05 PM

## 2018-01-31 NOTE — FLOWSHEET NOTE
Patient admitted to room 736 from recovery room. Oriented to room and surroundings. Plan of care reviewed. Verbalized understanding. Security and Visitation Guidelines. Call light placed within reach.

## 2018-01-31 NOTE — PROGRESS NOTES
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       Toyin Winter, DO  Ob/Gyn Resident  Pager: [phone]  1/31/2018, 2:49 PM

## 2018-01-31 NOTE — ANESTHESIA PRE PROCEDURE
Department of Anesthesiology  Preprocedure Note       Name:  Ramona Farr   Age:  32 y.o.  :  1990                                          MRN:  [id number]         Date:  2018      Surgeon: Maria Victoria Hinson):  Marilu Jacome MD    Procedure: Procedure(s): HYSTERECTOMY VAGINAL, POSSIBLE ABDOMINAL    Medications prior to admission:   Prior to Admission medications    Medication Sig Start Date End Date Taking? Authorizing Provider   acetaminophen-codeine (TYLENOL #3) 300-30 MG per tablet Take 1-2 tablets by mouth every 6 hours as needed for Pain . 17  Yes Marilu Jacome MD   oseltamivir (TAMIFLU) 30 MG capsule Take 30 mg by mouth 2 times daily    Historical Provider, MD       Current medications:    Current Facility-Administered Medications   Medication Dose Route Frequency Provider Last Rate Last Dose    ceFAZolin (ANCEF) 2 g in sterile water 20 mL IV syringe  2 g Intravenous Once Marilu Jacome MD        lactated ringers infusion   Intravenous Continuous Akosua Wood MD        lidocaine PF 1 % injection 1 mL  1 mL Intradermal Once PRN Akosua Wood MD        lactated ringers infusion 1,000 mL  1,000 mL Intravenous Continuous Jacquiline MD Jermaine 50 mL/hr at 18 0654 1,000 mL at 18 0654       Allergies:     Allergies   Allergen Reactions    Morphine Other (See Comments)     phlebitis       Problem List:    Patient Active Problem List   Diagnosis Code    History of bilateral tubal ligation Z98.51    CIN1 on colposcopy (17) Z87.410    Menorrhagia N92.0    Bradycardia R00.1    Chronic back pain M54.9, G89.29    H/O LSIL HRHPV+ pap 17 Z87.898    H/O endometriosis Z87.42    Pelvic pain in female R10.2    Menometrorrhagia N92.1    Uterine fibroid D25.9       Past Medical History:        Diagnosis Date    Bradycardia     Chronic back pain     LGSIL of cervix of undetermined significance 2017    HPV+    Menorrhagia     PONV (postoperative nausea and vomiting)     Prolonged emergence from general anesthesia     Uterine perforation 08/2017    history of during laparoscopy    Vasodepressor syncope     cardiac eval 8/2017 (inpatient at [facility], notes in [address])       Past Surgical History:        Procedure Laterality Date    COLPOSCOPY  05/18/2017    LORA I    SALPINGECTOMY Bilateral 08/18/2017    partial with D+C, hysteroscopy, failed NovaSure ablation    TUBAL LIGATION      WISDOM TOOTH EXTRACTION         Social History:    Social History   Substance Use Topics    Smoking status: Former Smoker    Smokeless tobacco: Never Used    Alcohol use No                                Counseling given: Not Answered      Vital Signs (Current):   Vitals:    01/31/18 0614 01/31/18 0635   BP:  95/62   Pulse:  75   Resp:  16   Temp:  97.9 °F (36.6 °C)   TempSrc:  Temporal   SpO2:  99%   Weight: 105 lb 13.1 oz (48 kg)    Height: 5' 5\" (1.651 m)                                               BP Readings from Last 3 Encounters:   01/31/18 95/62   01/23/18 104/64   12/21/17 110/70       NPO Status: Time of last liquid consumption: 2330                        Time of last solid consumption: 2330                        Date of last liquid consumption: 01/30/18                        Date of last solid food consumption: 01/30/18    BMI:   Wt Readings from Last 3 Encounters:   01/31/18 105 lb 13.1 oz (48 kg)   01/23/18 108 lb 0.4 oz (49 kg)   12/21/17 108 lb (49 kg)     Body mass index is 17.61 kg/m².     CBC:   Lab Results   Component Value Date    WBC 3.1 01/23/2018    RBC 4.68 01/23/2018    HGB 14.0 01/23/2018    HCT 42.4 01/23/2018    MCV 90.6 01/23/2018    RDW 12.7 01/23/2018     01/23/2018       CMP:   Lab Results   Component Value Date     01/23/2018    K 3.7 01/23/2018    CL 99 01/23/2018    CO2 27 01/23/2018    BUN 8 01/23/2018    CREATININE 0.52 01/23/2018    GFRAA >60 01/23/2018    LABGLOM >60 01/23/2018    GLUCOSE 100 01/23/2018    CALCIUM 9.1 01/23/2018 POC Tests: No results for input(s): POCGLU, POCNA, POCK, POCCL, POCBUN, POCHEMO, POCHCT in the last 72 hours. Coags:   Lab Results   Component Value Date    PROTIME 10.4 11/21/2013    INR 1.0 11/21/2013    APTT 27.0 11/21/2013       HCG (If Applicable):   Lab Results   Component Value Date    PREGTESTUR positive 11/09/2015    HCG NEGATIVE 11/21/2013    HCGQUANT <2 12/19/2011        ABGs: No results found for: PHART, PO2ART, VZN8DPA, XZJ0XDG, BEART, E3GULHMO     Type & Screen (If Applicable):  No results found for: [facility]    Anesthesia Evaluation  Patient summary reviewed   history of anesthetic complications: PONV. Airway: Mallampati: I  TM distance: >3 FB   Neck ROM: full  Mouth opening: > = 3 FB Dental:          Pulmonary: breath sounds clear to auscultation  (+) current smoker (smokes off and on- last cig 8 days ago)    (-) pneumonia, COPD, asthma, shortness of breath, recent URI and sleep apnea                          ROS comment: Had the flu 8 days ago   Cardiovascular:Negative CV ROS  Exercise tolerance: good (>4 METS),           Rhythm: regular  Rate: normal           Beta Blocker:  Not on Beta Blocker      ROS comment: Cardiogenic syncope     Neuro/Psych:   Negative Neuro/Psych ROS              GI/Hepatic/Renal: Neg GI/Hepatic/Renal ROS            Endo/Other: Negative Endo/Other ROS                    Abdominal:           Vascular: negative vascular ROS. Anesthesia Plan      general and regional     ASA 2       Induction: intravenous. MIPS: Postoperative opioids intended and Prophylactic antiemetics administered. Anesthetic plan and risks discussed with patient and spouse. Use of blood products discussed with patient and spouse whom. Plan discussed with CRNA.                   Nisha Raymundo MD   1/31/2018

## 2018-02-01 VITALS
HEIGHT: 65 IN | WEIGHT: 105.82 LBS | BODY MASS INDEX: 17.63 KG/M2 | OXYGEN SATURATION: 99 % | RESPIRATION RATE: 16 BRPM | TEMPERATURE: 98.2 F | SYSTOLIC BLOOD PRESSURE: 102 MMHG | HEART RATE: 67 BPM | DIASTOLIC BLOOD PRESSURE: 62 MMHG

## 2018-02-01 LAB
ANION GAP SERPL CALCULATED.3IONS-SCNC: 11 MMOL/L (ref 9–17)
BUN BLDV-MCNC: 6 MG/DL (ref 6–20)
BUN/CREAT BLD: ABNORMAL (ref 9–20)
CALCIUM SERPL-MCNC: 8.5 MG/DL (ref 8.6–10.4)
CHLORIDE BLD-SCNC: 107 MMOL/L (ref 98–107)
CO2: 26 MMOL/L (ref 20–31)
CREAT SERPL-MCNC: 0.43 MG/DL (ref 0.5–0.9)
GFR AFRICAN AMERICAN: >60 ML/MIN
GFR NON-AFRICAN AMERICAN: >60 ML/MIN
GFR SERPL CREATININE-BSD FRML MDRD: ABNORMAL ML/MIN/{1.73_M2}
GFR SERPL CREATININE-BSD FRML MDRD: ABNORMAL ML/MIN/{1.73_M2}
GLUCOSE BLD-MCNC: 97 MG/DL (ref 70–99)
HCT VFR BLD CALC: 32.3 % (ref 36.3–47.1)
HEMOGLOBIN: 10.6 G/DL (ref 11.9–15.1)
MCH RBC QN AUTO: 30.2 PG (ref 25.2–33.5)
MCHC RBC AUTO-ENTMCNC: 32.8 G/DL (ref 28.4–34.8)
MCV RBC AUTO: 92 FL (ref 82.6–102.9)
NRBC AUTOMATED: 0 PER 100 WBC
PDW BLD-RTO: 12.7 % (ref 11.8–14.4)
PLATELET # BLD: 193 K/UL (ref 138–453)
PMV BLD AUTO: 11.4 FL (ref 8.1–13.5)
POTASSIUM SERPL-SCNC: 3.6 MMOL/L (ref 3.7–5.3)
RBC # BLD: 3.51 M/UL (ref 3.95–5.11)
SODIUM BLD-SCNC: 144 MMOL/L (ref 135–144)
SURGICAL PATHOLOGY REPORT: NORMAL
WBC # BLD: 13.3 K/UL (ref 3.5–11.3)

## 2018-02-01 PROCEDURE — 36415 COLL VENOUS BLD VENIPUNCTURE: CPT

## 2018-02-01 PROCEDURE — 80048 BASIC METABOLIC PNL TOTAL CA: CPT

## 2018-02-01 PROCEDURE — 96372 THER/PROPH/DIAG INJ SC/IM: CPT

## 2018-02-01 PROCEDURE — G0378 HOSPITAL OBSERVATION PER HR: HCPCS

## 2018-02-01 PROCEDURE — 85027 COMPLETE CBC AUTOMATED: CPT

## 2018-02-01 PROCEDURE — 6360000002 HC RX W HCPCS: Performed by: OBSTETRICS & GYNECOLOGY

## 2018-02-01 PROCEDURE — 6370000000 HC RX 637 (ALT 250 FOR IP): Performed by: OBSTETRICS & GYNECOLOGY

## 2018-02-01 RX ORDER — IBUPROFEN 800 MG/1
800 TABLET ORAL EVERY 8 HOURS PRN
Qty: 30 TABLET | Refills: 0 | Status: SHIPPED | OUTPATIENT
Start: 2018-02-01 | End: 2021-03-08

## 2018-02-01 RX ORDER — ACETAMINOPHEN AND CODEINE PHOSPHATE 300; 30 MG/1; MG/1
1-2 TABLET ORAL EVERY 6 HOURS PRN
Qty: 30 TABLET | Refills: 0 | Status: SHIPPED | OUTPATIENT
Start: 2018-02-01 | End: 2018-02-08

## 2018-02-01 RX ORDER — SIMETHICONE 80 MG
80 TABLET,CHEWABLE ORAL EVERY 6 HOURS PRN
Qty: 30 TABLET | Refills: 0 | Status: SHIPPED | OUTPATIENT
Start: 2018-02-01 | End: 2021-03-08

## 2018-02-01 RX ORDER — IBUPROFEN 800 MG/1
800 TABLET ORAL EVERY 8 HOURS PRN
Status: DISCONTINUED | OUTPATIENT
Start: 2018-02-01 | End: 2018-02-01 | Stop reason: HOSPADM

## 2018-02-01 RX ORDER — PSEUDOEPHEDRINE HCL 30 MG
100 TABLET ORAL 2 TIMES DAILY
Qty: 60 CAPSULE | Refills: 0 | Status: SHIPPED | OUTPATIENT
Start: 2018-02-01 | End: 2021-03-08

## 2018-02-01 RX ADMIN — ENOXAPARIN SODIUM 40 MG: 40 INJECTION SUBCUTANEOUS at 08:46

## 2018-02-01 RX ADMIN — IBUPROFEN 800 MG: 800 TABLET, FILM COATED ORAL at 09:41

## 2018-02-01 RX ADMIN — ACETAMINOPHEN AND CODEINE PHOSPHATE 2 TABLET: 30; 300 TABLET ORAL at 08:45

## 2018-02-01 RX ADMIN — ACETAMINOPHEN AND CODEINE PHOSPHATE 2 TABLET: 30; 300 TABLET ORAL at 15:12

## 2018-02-01 RX ADMIN — SIMETHICONE CHEW TAB 80 MG 80 MG: 80 TABLET ORAL at 16:44

## 2018-02-01 RX ADMIN — DOCUSATE SODIUM 100 MG: 100 CAPSULE ORAL at 08:46

## 2018-02-01 ASSESSMENT — PAIN DESCRIPTION - DESCRIPTORS
DESCRIPTORS: THROBBING

## 2018-02-01 ASSESSMENT — PAIN SCALES - GENERAL
PAINLEVEL_OUTOF10: 8
PAINLEVEL_OUTOF10: 8
PAINLEVEL_OUTOF10: 7

## 2018-02-01 ASSESSMENT — PAIN DESCRIPTION - LOCATION
LOCATION: ABDOMEN

## 2018-02-01 ASSESSMENT — PAIN DESCRIPTION - PAIN TYPE
TYPE: SURGICAL PAIN

## 2018-02-01 NOTE — PROGRESS NOTES
Progress Note    Date: 2/1/2018  Time: 7:28 AM    Faye Gutiérrez 32 y.o. female [id number], POD # 1    Patient seen and examined. She reports sleeping about 4-5 hours last night. Pain is controlled. Patient is  tolerating clears and plans to eat toast this morning. She is urinating well. She denies any vaginal bleeding. She is ambulating without difficulty. She is not passing flatus. She denies Fever/Chills, Chest Pain, SOB, N/V, Calf Pain    Vitals:  Vitals:    01/31/18 1544 01/31/18 2000 02/01/18 0000 02/01/18 0400   BP: (!) 100/59 107/68 108/70 111/72   Pulse: 60 85 82 84   Resp: 16 16 16 18   Temp: 98.6 °F (37 °C) 97.7 °F (36.5 °C) 97.5 °F (36.4 °C) 97.6 °F (36.4 °C)   TempSrc:  Oral Oral Oral   SpO2: 99% 97% 99%    Weight:       Height:           Intake/Output:   Last Shift: I/O last 3 completed shifts: In: 7537 [P.O.:50; I.V.:1781]  Out: 1975 [Urine:1900; Blood:75]  Current Shift: No intake/output data recorded.        Physical Exam:  General:  no apparent distress, alert and cooperative  Neurologic:  alert, oriented, normal speech, no focal findings or movement disorder noted  Lungs:  No increased work of breathing, good air exchange, clear to auscultation bilaterally, no crackles or wheezing  Heart:  regular rate and rhythm and no murmur    Abdomen: soft, non-distended, appropriate tenderness, no CVA tenderness, normal bowel sounds  Extremities:  no calf tenderness, non edematous      Assessment/Plan:  Faye Gutiérrez 32 y.o. female [id number], POD #1 s/p TVH with hymenal remnant repair with h/o postop N/V              - Doing well, vitals stable              - motrin and tylenol #3 prn pain               - scopolamine patch in place, zofran and phenergan prn              - Lovenox and SCDs for DVT prophylaxis              - S/P TAP block preop   - IVF and acuña catheter discontinued last night              - CBC and BMP pending              - Encourage ambulation and use of incentive spirometer Continue post-op care, please page with any questions. Anticipate discharge to home later today. Discharge instructions reviewed and all questions answered.        [place] So, DO  Ob/Gyn Resident  Pager: [phone]  2/1/2018, 7:28 AM

## 2018-02-02 LAB
CULTURE: NORMAL
CULTURE: NORMAL
Lab: NORMAL
SPECIMEN DESCRIPTION: NORMAL
STATUS: NORMAL

## 2018-02-08 ENCOUNTER — HOSPITAL ENCOUNTER (OUTPATIENT)
Age: 28
Setting detail: SPECIMEN
Discharge: HOME | End: 2018-02-08
Payer: MEDICARE

## 2018-02-08 ENCOUNTER — OFFICE VISIT (OUTPATIENT)
Dept: OBGYN CLINIC | Age: 28
End: 2018-02-08

## 2018-02-08 VITALS
HEIGHT: 64 IN | BODY MASS INDEX: 18.44 KG/M2 | SYSTOLIC BLOOD PRESSURE: 110 MMHG | WEIGHT: 108 LBS | DIASTOLIC BLOOD PRESSURE: 68 MMHG

## 2018-02-08 DIAGNOSIS — R30.9 PAINFUL URINATION: Primary | ICD-10-CM

## 2018-02-08 DIAGNOSIS — G89.18 POSTOPERATIVE PAIN: ICD-10-CM

## 2018-02-08 DIAGNOSIS — R30.1 PAINFUL BLADDER SPASM: ICD-10-CM

## 2018-02-08 DIAGNOSIS — R30.9 PAINFUL URINATION: ICD-10-CM

## 2018-02-08 LAB
-: NORMAL
AMORPHOUS: NORMAL
BACTERIA: NORMAL
BILIRUBIN URINE: NEGATIVE
CASTS UA: NORMAL /LPF (ref 0–8)
COLOR: YELLOW
COMMENT UA: ABNORMAL
CRYSTALS, UA: NORMAL /HPF
EPITHELIAL CELLS UA: NORMAL /HPF (ref 0–5)
GLUCOSE URINE: NEGATIVE
KETONES, URINE: NEGATIVE
LEUKOCYTE ESTERASE, URINE: NEGATIVE
MUCUS: NORMAL
NITRITE, URINE: NEGATIVE
OTHER OBSERVATIONS UA: NORMAL
PH UA: 7 (ref 5–8)
PROTEIN UA: NEGATIVE
RBC UA: NORMAL /HPF (ref 0–4)
RENAL EPITHELIAL, UA: NORMAL /HPF
SPECIFIC GRAVITY UA: 1.02 (ref 1–1.03)
TRICHOMONAS: NORMAL
TURBIDITY: ABNORMAL
URINE HGB: ABNORMAL
UROBILINOGEN, URINE: NORMAL
WBC UA: NORMAL /HPF (ref 0–5)
YEAST: NORMAL

## 2018-02-08 PROCEDURE — G8428 CUR MEDS NOT DOCUMENT: HCPCS | Performed by: OBSTETRICS & GYNECOLOGY

## 2018-02-08 PROCEDURE — G8484 FLU IMMUNIZE NO ADMIN: HCPCS | Performed by: OBSTETRICS & GYNECOLOGY

## 2018-02-08 PROCEDURE — G8420 CALC BMI NORM PARAMETERS: HCPCS | Performed by: OBSTETRICS & GYNECOLOGY

## 2018-02-08 PROCEDURE — 1036F TOBACCO NON-USER: CPT | Performed by: OBSTETRICS & GYNECOLOGY

## 2018-02-08 PROCEDURE — 99024 POSTOP FOLLOW-UP VISIT: CPT | Performed by: OBSTETRICS & GYNECOLOGY

## 2018-02-08 RX ORDER — CIPROFLOXACIN 500 MG/1
500 TABLET, FILM COATED ORAL 2 TIMES DAILY
Qty: 20 TABLET | Refills: 0 | Status: SHIPPED | OUTPATIENT
Start: 2018-02-08 | End: 2018-02-18

## 2018-02-08 RX ORDER — PHENAZOPYRIDINE HYDROCHLORIDE 200 MG/1
200 TABLET, FILM COATED ORAL 3 TIMES DAILY PRN
Qty: 9 TABLET | Refills: 0 | Status: SHIPPED | OUTPATIENT
Start: 2018-02-08 | End: 2018-02-11

## 2018-02-08 NOTE — PROGRESS NOTES
Timur Holman returns today 1 week postop from having an unremarkable TVH. She presents stating that she has the normal urge to void however when should begin screening urinating she has intense spasms which are low and in the midline. She is able to empty her bladder and is not having any involuntary loss of urine. With 1 Percocet she is able to sleep throughout the night. She denies any fever, chills, nausea/vomiting, significant abdominal/pelvic discomfort, constipation or diarrhea. She presented to urgent care with complaints of cough, was diagnosed with pneumonia and given ceftriaxone IM and doxycycline. Physical exam       Vitals:    02/08/18 1448   BP: 110/68       General appearance: Patient appears to be in no significant distress. Lungs are clear to auscultation in all fields bilaterally without wheezes, rales or rhonchi. Cardiac exam with normal sinus rhythm and rate without murmurs. The abdomen Is slightly tympanic, is non-tender without signs of infection. There is no guarding, rebound, organomegaly or CVAT. Bowel sounds are normally active. Speculum exam reveals small amount of brown discharge present. Bimanual exam reveals an intact cuff and no midline or definite adnexal masses were appreciated. Extremities nontender without edema. Assessment/Plan: Bladder spasm, rule out UTI. She was treated empirically for UTI with Cipro 500 mg twice a day for 10 days and Pyridium 200 mg 3 times a day ×3 days. Urinalysis with culture sent. She will follow up in 1 week or as needed. Patient was seen with total face to face time of 15 minutes.

## 2018-02-08 NOTE — PATIENT INSTRUCTIONS
Please pickup your new prescription for the antibiotic and bladder spasm and begin taking as directed. Return to the office next week or as needed.

## 2018-02-09 LAB
CULTURE: NO GROWTH
CULTURE: NORMAL
Lab: NORMAL
SPECIMEN DESCRIPTION: NORMAL
STATUS: NORMAL

## 2018-02-15 ENCOUNTER — OFFICE VISIT (OUTPATIENT)
Dept: OBGYN CLINIC | Age: 28
End: 2018-02-15

## 2018-02-15 ENCOUNTER — HOSPITAL ENCOUNTER (OUTPATIENT)
Dept: CT IMAGING | Age: 28
Discharge: HOME | End: 2018-02-17
Payer: MEDICARE

## 2018-02-15 VITALS
HEIGHT: 64 IN | DIASTOLIC BLOOD PRESSURE: 72 MMHG | WEIGHT: 108 LBS | SYSTOLIC BLOOD PRESSURE: 110 MMHG | BODY MASS INDEX: 18.44 KG/M2

## 2018-02-15 DIAGNOSIS — R30.0 DIFFICULT OR PAINFUL URINATION: ICD-10-CM

## 2018-02-15 DIAGNOSIS — G89.18 POSTOPERATIVE PAIN: Primary | ICD-10-CM

## 2018-02-15 DIAGNOSIS — R30.9 PAINFUL URINATION: ICD-10-CM

## 2018-02-15 DIAGNOSIS — G89.18 POSTOPERATIVE PAIN: ICD-10-CM

## 2018-02-15 PROCEDURE — 1036F TOBACCO NON-USER: CPT | Performed by: OBSTETRICS & GYNECOLOGY

## 2018-02-15 PROCEDURE — 99024 POSTOP FOLLOW-UP VISIT: CPT | Performed by: OBSTETRICS & GYNECOLOGY

## 2018-02-15 PROCEDURE — G8420 CALC BMI NORM PARAMETERS: HCPCS | Performed by: OBSTETRICS & GYNECOLOGY

## 2018-02-15 PROCEDURE — 74178 CT ABD&PLV WO CNTR FLWD CNTR: CPT

## 2018-02-15 PROCEDURE — 6360000004 HC RX CONTRAST MEDICATION: Performed by: OBSTETRICS & GYNECOLOGY

## 2018-02-15 PROCEDURE — G8484 FLU IMMUNIZE NO ADMIN: HCPCS | Performed by: OBSTETRICS & GYNECOLOGY

## 2018-02-15 PROCEDURE — G8428 CUR MEDS NOT DOCUMENT: HCPCS | Performed by: OBSTETRICS & GYNECOLOGY

## 2018-02-15 RX ADMIN — IOPAMIDOL 130 ML: 755 INJECTION, SOLUTION INTRAVENOUS at 13:03

## 2018-02-15 NOTE — PROGRESS NOTES
There is no organomegaly or CVAT. Bowel sounds are normally active. Speculum exam reveals an intact vaginal cuff. Bimanual exam confirms. There is some fullness in the posterior cul-de-sac. Adnexa nontender without abnormal masses bilaterally. Extremities nontender without edema. Assessment/Plan: Postoperative voiding dysfunction possibly related to over distention but need to rule out any obstruction. Meliton Cruz urinated prior to her exam today was allowed to urinate afterwards. You're on her measured 50 mL. Under a sepsis she was catheterized in the office today and only 25 ml was retrieved. CT urogram was ordered to assess her urinary outflow tract and any possible pelvic pathology. She  will be contacted with those results and recommendation for follow-up. Patient was seen with total face to face time of 15 minutes.

## 2018-02-19 ENCOUNTER — OFFICE VISIT (OUTPATIENT)
Dept: OBGYN CLINIC | Age: 28
End: 2018-02-19

## 2018-02-19 VITALS
HEIGHT: 64 IN | WEIGHT: 105 LBS | DIASTOLIC BLOOD PRESSURE: 70 MMHG | SYSTOLIC BLOOD PRESSURE: 118 MMHG | BODY MASS INDEX: 17.93 KG/M2

## 2018-02-19 DIAGNOSIS — Z09 POSTOP CHECK: Primary | ICD-10-CM

## 2018-02-19 PROCEDURE — G8484 FLU IMMUNIZE NO ADMIN: HCPCS | Performed by: OBSTETRICS & GYNECOLOGY

## 2018-02-19 PROCEDURE — G8419 CALC BMI OUT NRM PARAM NOF/U: HCPCS | Performed by: OBSTETRICS & GYNECOLOGY

## 2018-02-19 PROCEDURE — 1036F TOBACCO NON-USER: CPT | Performed by: OBSTETRICS & GYNECOLOGY

## 2018-02-19 PROCEDURE — 99024 POSTOP FOLLOW-UP VISIT: CPT | Performed by: OBSTETRICS & GYNECOLOGY

## 2018-02-19 PROCEDURE — G8427 DOCREV CUR MEDS BY ELIG CLIN: HCPCS | Performed by: OBSTETRICS & GYNECOLOGY

## 2018-02-19 NOTE — PATIENT INSTRUCTIONS
You can slowly increase her daily activities but no intercourse for another 2-3 weeks. Return to the office in April for your annual exam or as needed.

## 2018-02-26 ENCOUNTER — TELEPHONE (OUTPATIENT)
Dept: OBGYN CLINIC | Age: 28
End: 2018-02-26

## 2018-05-03 ENCOUNTER — OFFICE VISIT (OUTPATIENT)
Dept: OBGYN CLINIC | Age: 28
End: 2018-05-03
Payer: MEDICARE

## 2018-05-03 ENCOUNTER — HOSPITAL ENCOUNTER (OUTPATIENT)
Age: 28
Setting detail: SPECIMEN
Discharge: HOME | End: 2018-05-03
Payer: MEDICARE

## 2018-05-03 VITALS
WEIGHT: 110 LBS | BODY MASS INDEX: 18.78 KG/M2 | SYSTOLIC BLOOD PRESSURE: 110 MMHG | DIASTOLIC BLOOD PRESSURE: 70 MMHG | HEIGHT: 64 IN

## 2018-05-03 DIAGNOSIS — N89.8 VAGINAL DISCHARGE: Primary | ICD-10-CM

## 2018-05-03 DIAGNOSIS — N89.8 VAGINAL DISCHARGE: ICD-10-CM

## 2018-05-03 LAB
DIRECT EXAM: ABNORMAL
Lab: ABNORMAL
SPECIMEN DESCRIPTION: ABNORMAL
STATUS: ABNORMAL

## 2018-05-03 PROCEDURE — 99213 OFFICE O/P EST LOW 20 MIN: CPT | Performed by: OBSTETRICS & GYNECOLOGY

## 2018-05-03 PROCEDURE — 1036F TOBACCO NON-USER: CPT | Performed by: OBSTETRICS & GYNECOLOGY

## 2018-05-03 PROCEDURE — G8420 CALC BMI NORM PARAMETERS: HCPCS | Performed by: OBSTETRICS & GYNECOLOGY

## 2018-05-03 PROCEDURE — G8428 CUR MEDS NOT DOCUMENT: HCPCS | Performed by: OBSTETRICS & GYNECOLOGY

## 2018-05-04 LAB
C TRACH DNA GENITAL QL NAA+PROBE: NEGATIVE
N. GONORRHOEAE DNA: NEGATIVE

## 2018-05-07 RX ORDER — METRONIDAZOLE 500 MG/1
500 TABLET ORAL 2 TIMES DAILY
Qty: 14 TABLET | Refills: 0 | Status: SHIPPED | OUTPATIENT
Start: 2018-05-07 | End: 2018-05-14

## 2018-12-17 ENCOUNTER — HOSPITAL ENCOUNTER (OUTPATIENT)
Age: 28
Setting detail: SPECIMEN
Discharge: HOME | End: 2018-12-17
Payer: MEDICARE

## 2018-12-20 LAB — SURGICAL PATHOLOGY REPORT: NORMAL

## 2019-01-24 ENCOUNTER — HOSPITAL ENCOUNTER (OUTPATIENT)
Age: 29
Setting detail: SPECIMEN
Discharge: HOME | End: 2019-01-24
Payer: MEDICARE

## 2019-01-24 ENCOUNTER — OFFICE VISIT (OUTPATIENT)
Dept: OBGYN CLINIC | Age: 29
End: 2019-01-24
Payer: MEDICARE

## 2019-01-24 VITALS
BODY MASS INDEX: 18.95 KG/M2 | SYSTOLIC BLOOD PRESSURE: 100 MMHG | DIASTOLIC BLOOD PRESSURE: 72 MMHG | WEIGHT: 111 LBS | HEIGHT: 64 IN

## 2019-01-24 DIAGNOSIS — Z01.419 ENCOUNTER FOR GYNECOLOGICAL EXAMINATION WITHOUT ABNORMAL FINDING: Primary | ICD-10-CM

## 2019-01-24 DIAGNOSIS — R30.0 DIFFICULT OR PAINFUL URINATION: ICD-10-CM

## 2019-01-24 PROCEDURE — G8484 FLU IMMUNIZE NO ADMIN: HCPCS | Performed by: OBSTETRICS & GYNECOLOGY

## 2019-01-24 PROCEDURE — 99395 PREV VISIT EST AGE 18-39: CPT | Performed by: OBSTETRICS & GYNECOLOGY

## 2019-01-24 ASSESSMENT — PATIENT HEALTH QUESTIONNAIRE - PHQ9
1. LITTLE INTEREST OR PLEASURE IN DOING THINGS: 0
SUM OF ALL RESPONSES TO PHQ QUESTIONS 1-9: 0
SUM OF ALL RESPONSES TO PHQ QUESTIONS 1-9: 0
SUM OF ALL RESPONSES TO PHQ9 QUESTIONS 1 & 2: 0
2. FEELING DOWN, DEPRESSED OR HOPELESS: 0

## 2019-02-08 LAB — CYTOLOGY REPORT: NORMAL

## 2019-02-11 LAB
HPV SAMPLE: NORMAL
HPV, GENOTYPE 16: NOT DETECTED
HPV, GENOTYPE 18: NOT DETECTED
HPV, HIGH RISK OTHER: NOT DETECTED
HPV, INTERPRETATION: NORMAL
SPECIMEN DESCRIPTION: NORMAL

## 2019-06-04 ENCOUNTER — OFFICE VISIT (OUTPATIENT)
Dept: OBGYN CLINIC | Age: 29
End: 2019-06-04
Payer: MEDICARE

## 2019-06-04 VITALS
BODY MASS INDEX: 18.95 KG/M2 | WEIGHT: 111 LBS | DIASTOLIC BLOOD PRESSURE: 70 MMHG | SYSTOLIC BLOOD PRESSURE: 110 MMHG | HEIGHT: 64 IN

## 2019-06-04 DIAGNOSIS — N83.202 LEFT OVARIAN CYST: Primary | ICD-10-CM

## 2019-06-04 DIAGNOSIS — R10.2 PELVIC PAIN IN FEMALE: ICD-10-CM

## 2019-06-04 PROCEDURE — G8420 CALC BMI NORM PARAMETERS: HCPCS | Performed by: OBSTETRICS & GYNECOLOGY

## 2019-06-04 PROCEDURE — 1036F TOBACCO NON-USER: CPT | Performed by: OBSTETRICS & GYNECOLOGY

## 2019-06-04 PROCEDURE — G8428 CUR MEDS NOT DOCUMENT: HCPCS | Performed by: OBSTETRICS & GYNECOLOGY

## 2019-06-04 PROCEDURE — 99213 OFFICE O/P EST LOW 20 MIN: CPT | Performed by: OBSTETRICS & GYNECOLOGY

## 2019-06-04 RX ORDER — DOXYLAMINE SUCCINATE AND PYRIDOXINE HYDROCHLORIDE, DELAYED RELEASE TABLETS 10 MG/10 MG 10; 10 MG/1; MG/1
10 TABLET, DELAYED RELEASE ORAL 4 TIMES DAILY PRN
Qty: 20 TABLET | Refills: 1 | Status: SHIPPED | OUTPATIENT
Start: 2019-06-04 | End: 2019-06-09

## 2019-06-04 NOTE — PATIENT INSTRUCTIONS
Please fill your prescription for pain medications and take one half tablet every 4 hours as needed. You will also have a prescription for nausea and vomiting. Return to the office in January for your annual or as needed.

## 2019-06-04 NOTE — PROGRESS NOTES
Delaney Kline returns today for post ED checkup after presenting with acute left lower quadrant pain. She states the pain has been present for the past 2 months and became intense with nausea and vomiting prompting her to go to the emergency room. She denies any fever, chills, but still is having some nausea and vomiting. She was discharged home on Norco and Zofran. She did not fill the Norco and the Zofran was giving her side effects such as headache. Ultrasound results  History: Left adnexal cyst.    EXAM: Pelvic ultrasound complete transabdominal transvaginal imaging, duplex ultrasound. COMPARISON: 8/11/2015, CT abdomen pelvis 5/31/2019    FINDINGS: Surgically absent uterus.  Minimal free fluid.  Transvaginal imaging was obtained to better visualize the ovaries.  Right ovary 3.4 x 2.9 x 3.1 cm with small follicles.  Left ovary 6.2 x 4.3 x 5.3 cm, complex 4.8 cm left ovarian cyst.    Duplex ultrasound was obtained to evaluate for vascular lesion.  Color imaging shows blood flow to the ovaries.  Spectral waveform analysis identifies venous intercrural waveforms in the ovaries.  No blood flow within the cystic lesion. IMPRESSION:  No evidence for ovarian torsion. Complex 4.8 cm left ovarian cyst likely hemorrhagic. Physical exam      Vitals:    06/04/19 0917   BP: 110/70   Site: Right Upper Arm   Position: Sitting   Cuff Size: Medium Adult   Weight: 111 lb (50.3 kg)   Height: 5' 3.5\" (1.613 m)       General appearance: Patient appears to be in no significant distress. The abdomen is soft and patient is being guarding. There is no rebound tenderness. Bimanual exam was difficult due to patient apprehension. She was moderately tender and no adnexal masses could be appreciated. Extremities nontender without edema. Assessment/Plan:     ICD-10-CM    1. Left ovarian cyst N83.202    2. Pelvic pain in female R10.2          Ultrasound findings were discussed with patient.   She was instructed to slowly resume her normal activities and she was prescribed likely just been instructed to take 1/2 tab Norco every 4 hours when necessary. She will return to the office in January for her annual or as needed    Patient was seen with total face to face time of 15 minutes.

## 2019-06-10 ENCOUNTER — TELEPHONE (OUTPATIENT)
Dept: OBGYN CLINIC | Age: 29
End: 2019-06-10

## 2019-06-10 DIAGNOSIS — Z20.828 EXPOSURE TO HERPES: Primary | ICD-10-CM

## 2019-06-10 NOTE — TELEPHONE ENCOUNTER
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

## 2019-11-11 ENCOUNTER — HOSPITAL ENCOUNTER (EMERGENCY)
Facility: CLINIC | Age: 29
Discharge: HOME | End: 2019-11-11
Attending: EMERGENCY MEDICINE
Payer: MEDICARE

## 2019-11-11 ENCOUNTER — HOSPITAL ENCOUNTER (EMERGENCY)
Facility: CLINIC | Age: 29
Discharge: HOME | End: 2019-11-12
Attending: EMERGENCY MEDICINE
Payer: MEDICARE

## 2019-11-11 VITALS
OXYGEN SATURATION: 98 % | HEART RATE: 86 BPM | TEMPERATURE: 98.1 F | HEIGHT: 65 IN | RESPIRATION RATE: 15 BRPM | BODY MASS INDEX: 18.83 KG/M2 | DIASTOLIC BLOOD PRESSURE: 91 MMHG | WEIGHT: 113 LBS | SYSTOLIC BLOOD PRESSURE: 119 MMHG

## 2019-11-11 DIAGNOSIS — B02.9 HERPES ZOSTER WITHOUT COMPLICATION: Primary | ICD-10-CM

## 2019-11-11 DIAGNOSIS — T78.40XA ALLERGIC REACTION, INITIAL ENCOUNTER: Primary | ICD-10-CM

## 2019-11-11 PROCEDURE — 96375 TX/PRO/DX INJ NEW DRUG ADDON: CPT

## 2019-11-11 PROCEDURE — 96374 THER/PROPH/DIAG INJ IV PUSH: CPT

## 2019-11-11 PROCEDURE — 6370000000 HC RX 637 (ALT 250 FOR IP): Performed by: EMERGENCY MEDICINE

## 2019-11-11 PROCEDURE — 99282 EMERGENCY DEPT VISIT SF MDM: CPT

## 2019-11-11 PROCEDURE — 99283 EMERGENCY DEPT VISIT LOW MDM: CPT

## 2019-11-11 PROCEDURE — 2500000003 HC RX 250 WO HCPCS: Performed by: EMERGENCY MEDICINE

## 2019-11-11 PROCEDURE — 6360000002 HC RX W HCPCS: Performed by: EMERGENCY MEDICINE

## 2019-11-11 RX ORDER — METHYLPREDNISOLONE SODIUM SUCCINATE 125 MG/2ML
125 INJECTION, POWDER, LYOPHILIZED, FOR SOLUTION INTRAMUSCULAR; INTRAVENOUS ONCE
Status: COMPLETED | OUTPATIENT
Start: 2019-11-11 | End: 2019-11-11

## 2019-11-11 RX ORDER — ACYCLOVIR 200 MG/1
400 CAPSULE ORAL ONCE
Status: COMPLETED | OUTPATIENT
Start: 2019-11-11 | End: 2019-11-11

## 2019-11-11 RX ORDER — ACYCLOVIR 800 MG/1
800 TABLET ORAL
Qty: 50 TABLET | Refills: 0 | Status: SHIPPED | OUTPATIENT
Start: 2019-11-11 | End: 2019-11-21

## 2019-11-11 RX ORDER — HYDROCODONE BITARTRATE AND ACETAMINOPHEN 5; 325 MG/1; MG/1
2 TABLET ORAL ONCE
Status: COMPLETED | OUTPATIENT
Start: 2019-11-11 | End: 2019-11-11

## 2019-11-11 RX ORDER — ACYCLOVIR 50 MG/G
OINTMENT TOPICAL
Qty: 15 G | Refills: 1 | Status: SHIPPED | OUTPATIENT
Start: 2019-11-11 | End: 2019-11-18

## 2019-11-11 RX ORDER — DIPHENHYDRAMINE HYDROCHLORIDE 50 MG/ML
25 INJECTION INTRAMUSCULAR; INTRAVENOUS ONCE
Status: COMPLETED | OUTPATIENT
Start: 2019-11-11 | End: 2019-11-11

## 2019-11-11 RX ORDER — HYDROCODONE BITARTRATE AND ACETAMINOPHEN 5; 325 MG/1; MG/1
1 TABLET ORAL EVERY 6 HOURS PRN
Qty: 10 TABLET | Refills: 0 | Status: SHIPPED | OUTPATIENT
Start: 2019-11-11 | End: 2019-11-14

## 2019-11-11 RX ADMIN — FAMOTIDINE 20 MG: 10 INJECTION, SOLUTION INTRAVENOUS at 23:45

## 2019-11-11 RX ADMIN — ACYCLOVIR 400 MG: 200 CAPSULE ORAL at 22:54

## 2019-11-11 RX ADMIN — HYDROCODONE BITARTRATE AND ACETAMINOPHEN 2 TABLET: 5; 325 TABLET ORAL at 22:53

## 2019-11-11 RX ADMIN — METHYLPREDNISOLONE SODIUM SUCCINATE 125 MG: 125 INJECTION, POWDER, FOR SOLUTION INTRAMUSCULAR; INTRAVENOUS at 23:41

## 2019-11-11 RX ADMIN — DIPHENHYDRAMINE HYDROCHLORIDE 25 MG: 50 INJECTION, SOLUTION INTRAMUSCULAR; INTRAVENOUS at 23:43

## 2019-11-11 ASSESSMENT — PAIN DESCRIPTION - PAIN TYPE
TYPE: ACUTE PAIN

## 2019-11-11 ASSESSMENT — ENCOUNTER SYMPTOMS
CONSTIPATION: 0
VOMITING: 0
DIARRHEA: 0
SHORTNESS OF BREATH: 0
WHEEZING: 0
COUGH: 0
NAUSEA: 0
EYE PAIN: 0
COLOR CHANGE: 0
EYE DISCHARGE: 0
SORE THROAT: 0
EYE REDNESS: 0
ABDOMINAL PAIN: 0
STRIDOR: 0

## 2019-11-11 ASSESSMENT — PAIN DESCRIPTION - DESCRIPTORS
DESCRIPTORS: BURNING

## 2019-11-11 ASSESSMENT — PAIN DESCRIPTION - FREQUENCY
FREQUENCY: CONTINUOUS

## 2019-11-11 ASSESSMENT — PAIN SCALES - GENERAL
PAINLEVEL_OUTOF10: 10

## 2019-11-11 ASSESSMENT — PAIN DESCRIPTION - ORIENTATION
ORIENTATION: LEFT

## 2019-11-11 ASSESSMENT — PAIN DESCRIPTION - LOCATION
LOCATION: FLANK

## 2019-11-12 VITALS
HEART RATE: 86 BPM | TEMPERATURE: 97.8 F | BODY MASS INDEX: 19.16 KG/M2 | SYSTOLIC BLOOD PRESSURE: 128 MMHG | RESPIRATION RATE: 15 BRPM | HEIGHT: 65 IN | WEIGHT: 115 LBS | DIASTOLIC BLOOD PRESSURE: 93 MMHG | OXYGEN SATURATION: 98 %

## 2019-11-12 RX ORDER — PREDNISONE 20 MG/1
20 TABLET ORAL 2 TIMES DAILY
Qty: 10 TABLET | Refills: 0 | Status: SHIPPED | OUTPATIENT
Start: 2019-11-12 | End: 2019-11-17

## 2019-11-12 ASSESSMENT — ENCOUNTER SYMPTOMS
CONSTIPATION: 0
STRIDOR: 0
EYE REDNESS: 0
DIARRHEA: 0
WHEEZING: 0
EYE DISCHARGE: 0
SORE THROAT: 0
ABDOMINAL PAIN: 0
EYE PAIN: 0
COLOR CHANGE: 0
COUGH: 0
SHORTNESS OF BREATH: 0
TROUBLE SWALLOWING: 1
VOMITING: 0
NAUSEA: 0

## 2020-03-18 ENCOUNTER — APPOINTMENT (OUTPATIENT)
Dept: GENERAL RADIOLOGY | Facility: CLINIC | Age: 30
End: 2020-03-18
Payer: MEDICARE

## 2020-03-18 ENCOUNTER — HOSPITAL ENCOUNTER (EMERGENCY)
Facility: CLINIC | Age: 30
Discharge: HOME | End: 2020-03-18
Attending: EMERGENCY MEDICINE
Payer: MEDICARE

## 2020-03-18 VITALS
TEMPERATURE: 97.6 F | OXYGEN SATURATION: 99 % | HEIGHT: 65 IN | WEIGHT: 115 LBS | DIASTOLIC BLOOD PRESSURE: 74 MMHG | SYSTOLIC BLOOD PRESSURE: 105 MMHG | HEART RATE: 64 BPM | RESPIRATION RATE: 15 BRPM | BODY MASS INDEX: 19.16 KG/M2

## 2020-03-18 LAB
DIRECT EXAM: NORMAL
DIRECT EXAM: NORMAL
Lab: NORMAL
Lab: NORMAL
SPECIMEN DESCRIPTION: NORMAL
SPECIMEN DESCRIPTION: NORMAL

## 2020-03-18 PROCEDURE — 87880 STREP A ASSAY W/OPTIC: CPT

## 2020-03-18 PROCEDURE — 71046 X-RAY EXAM CHEST 2 VIEWS: CPT

## 2020-03-18 PROCEDURE — 99283 EMERGENCY DEPT VISIT LOW MDM: CPT

## 2020-03-18 PROCEDURE — 87804 INFLUENZA ASSAY W/OPTIC: CPT

## 2020-03-18 RX ORDER — BENZONATATE 200 MG/1
200 CAPSULE ORAL 3 TIMES DAILY PRN
Qty: 30 CAPSULE | Refills: 0 | Status: SHIPPED | OUTPATIENT
Start: 2020-03-18 | End: 2020-03-28

## 2020-03-18 ASSESSMENT — ENCOUNTER SYMPTOMS
DIARRHEA: 0
COUGH: 1
VOMITING: 0
BACK PAIN: 0
NAUSEA: 0
SHORTNESS OF BREATH: 0
ABDOMINAL PAIN: 0
CONSTIPATION: 0
SORE THROAT: 1
EYE PAIN: 0

## 2020-03-18 ASSESSMENT — PAIN DESCRIPTION - PAIN TYPE: TYPE: ACUTE PAIN

## 2020-03-18 ASSESSMENT — PAIN SCALES - GENERAL: PAINLEVEL_OUTOF10: 8

## 2020-03-18 ASSESSMENT — PAIN DESCRIPTION - LOCATION: LOCATION: GENERALIZED

## 2020-03-18 ASSESSMENT — PAIN DESCRIPTION - DESCRIPTORS: DESCRIPTORS: ACHING

## 2020-03-18 NOTE — ED PROVIDER NOTES
Prolonged emergence from general anesthesia, Uterine perforation, and Vasodepressor syncope. SURGICAL HISTORY      has a past surgical history that includes Colposcopy (05/18/2017); salpingectomy (Bilateral, 08/18/2017); Tubal ligation; [place] tooth extraction; Hysterectomy (01/31/2018); and pr vaginal hysterectomy,uterus 250 gms/< (N/A, 1/31/2018). CURRENT MEDICATIONS       Previous Medications    DOCUSATE SODIUM (COLACE, DULCOLAX) 100 MG CAPS    Take 100 mg by mouth 2 times daily    IBUPROFEN (ADVIL;MOTRIN) 800 MG TABLET    Take 1 tablet by mouth every 8 hours as needed for Pain    SIMETHICONE (MYLICON) 80 MG CHEWABLE TABLET    Take 1 tablet by mouth every 6 hours as needed (cramping)       ALLERGIES     is allergic to morphine. FAMILY HISTORY     She indicated that her mother is alive. She indicated that her father is alive. She indicated that her maternal grandmother is alive. She indicated that her maternal grandfather is alive. She indicated that her paternal grandmother is alive. She indicated that her paternal grandfather is alive. family history includes Diabetes in her maternal grandmother; Heart Disease in her father; Other in her mother. SOCIAL HISTORY      reports that she has been smoking cigarettes. She has been smoking about 1.00 pack per day. She has never used smokeless tobacco. She reports that she does not drink alcohol or use drugs. PHYSICAL EXAM     INITIAL VITALS:  height is 5' 5\" (1.651 m) and weight is 52.2 kg (115 lb). Her oral temperature is 97.6 °F (36.4 °C). Her blood pressure is 105/74 and her pulse is 64. Her respiration is 15 and oxygen saturation is 99%. Physical Exam  Constitutional:       Appearance: She is well-developed. HENT:      Head: Normocephalic and atraumatic. Right Ear: External ear normal.      Left Ear: External ear normal.      Mouth/Throat:      Pharynx: Posterior oropharyngeal erythema present. No oropharyngeal exudate.    Eyes: Conjunctiva/sclera: Conjunctivae normal.      Pupils: Pupils are equal, round, and reactive to light. Neck:      Musculoskeletal: Normal range of motion. Cardiovascular:      Rate and Rhythm: Normal rate and regular rhythm. Pulmonary:      Effort: Pulmonary effort is normal. No respiratory distress. Breath sounds: Normal breath sounds. No stridor. No wheezing, rhonchi or rales. Chest:      Chest wall: No tenderness. Abdominal:      General: Bowel sounds are normal.      Palpations: Abdomen is soft. Musculoskeletal: Normal range of motion. General: No tenderness. Skin:     General: Skin is warm and dry. Neurological:      Mental Status: She is alert and oriented to person, place, and time. Psychiatric:         Behavior: Behavior normal.           DIFFERENTIAL DIAGNOSIS/ MDM:     Viral syndrome, versus strep or pneumonia we will do a work-up will check an influenza chest x-ray and strep    DIAGNOSTIC RESULTS     EKG: All EKG's are interpreted by the Emergency Department Physician who either signs or Co-signs this chart in the absence of a cardiologist.        RADIOLOGY:   Non-plain film images such as CT, Ultrasound and MRI are read by the radiologist. eddie McBride radiographic images are visualized and the radiologist interpretations are reviewed as follows:     Chest x-ray PA and lateral no infiltrate normal mediastinum and no pneumothorax    LABS:  Results for orders placed or performed during the hospital encounter of 03/18/20   Rapid influenza A/B antigens   Result Value Ref Range    Specimen Description . NASOPHARYNGEAL SWAB     Special Requests NOT REPORTED     Direct Exam       NEGATIVE for Influenza A + B antigens. PCR testing to confirm this result is available upon request.  Specimen will be saved in the laboratory for 7 days. Please call [phone] if PCR testing is indicated.    Strep Screen Group A Throat   Result Value Ref Range    Specimen Description . THROAT     Special Requests NOT REPORTED     Direct Exam       Rapid Strep A negative. A negative Rapid Group A Strep Screen result does not rule out the possibility of Group A Streptococci in the specimen. A Group A Strep DNA test is available upon request.           EMERGENCY DEPARTMENT COURSE:   Vitals:    Vitals:    03/18/20 0711   BP: 105/74   Pulse: 64   Resp: 15   Temp: 97.6 °F (36.4 °C)   TempSrc: Oral   SpO2: 99%   Weight: 52.2 kg (115 lb)   Height: 5' 5\" (1.651 m)     -------------------------  BP: 105/74, Temp: 97.6 °F (36.4 °C), Pulse: 64, Resp: 15          CONSULTS:      PROCEDURES:  None    FINAL IMPRESSION      1. Viral URI with cough          DISPOSITION/PLAN   Discharged in stable condition    PATIENT REFERRED TO:  Physician of choice            DISCHARGE MEDICATIONS:  New Prescriptions    BENZONATATE (TESSALON) 200 MG CAPSULE    Take 1 capsule by mouth 3 times daily as needed for Cough       (Please note that portions of this note were completed with a voice recognition program.  Efforts were made to edit the dictations but occasionally words are mis-transcribed.)    Brent Feliciano,, MD, F.A.A.E.M.   Attending Emergency Medicine Physician      Brent Feliciano MD  03/18/20 5901

## 2020-06-29 ENCOUNTER — HOSPITAL ENCOUNTER (EMERGENCY)
Age: 30
Discharge: HOME | End: 2020-06-29
Attending: EMERGENCY MEDICINE
Payer: MEDICARE

## 2020-06-29 ENCOUNTER — APPOINTMENT (OUTPATIENT)
Dept: GENERAL RADIOLOGY | Age: 30
End: 2020-06-29
Payer: MEDICARE

## 2020-06-29 VITALS
HEART RATE: 108 BPM | RESPIRATION RATE: 18 BRPM | DIASTOLIC BLOOD PRESSURE: 87 MMHG | OXYGEN SATURATION: 98 % | BODY MASS INDEX: 19.49 KG/M2 | HEIGHT: 65 IN | TEMPERATURE: 97.8 F | WEIGHT: 117 LBS | SYSTOLIC BLOOD PRESSURE: 126 MMHG

## 2020-06-29 PROCEDURE — 2500000003 HC RX 250 WO HCPCS: Performed by: EMERGENCY MEDICINE

## 2020-06-29 PROCEDURE — 99283 EMERGENCY DEPT VISIT LOW MDM: CPT

## 2020-06-29 PROCEDURE — 90715 TDAP VACCINE 7 YRS/> IM: CPT | Performed by: EMERGENCY MEDICINE

## 2020-06-29 PROCEDURE — 6370000000 HC RX 637 (ALT 250 FOR IP): Performed by: EMERGENCY MEDICINE

## 2020-06-29 PROCEDURE — 90471 IMMUNIZATION ADMIN: CPT | Performed by: EMERGENCY MEDICINE

## 2020-06-29 PROCEDURE — 12001 RPR S/N/AX/GEN/TRNK 2.5CM/<: CPT

## 2020-06-29 PROCEDURE — 6360000002 HC RX W HCPCS: Performed by: EMERGENCY MEDICINE

## 2020-06-29 PROCEDURE — 73130 X-RAY EXAM OF HAND: CPT

## 2020-06-29 RX ORDER — BUPIVACAINE HYDROCHLORIDE 5 MG/ML
30 INJECTION, SOLUTION PERINEURAL ONCE
Status: COMPLETED | OUTPATIENT
Start: 2020-06-29 | End: 2020-06-29

## 2020-06-29 RX ORDER — ACETAMINOPHEN 500 MG
1000 TABLET ORAL ONCE
Status: COMPLETED | OUTPATIENT
Start: 2020-06-29 | End: 2020-06-29

## 2020-06-29 RX ADMIN — BUPIVACAINE HYDROCHLORIDE 150 MG: 5 INJECTION, SOLUTION EPIDURAL; INTRACAUDAL; PERINEURAL at 19:42

## 2020-06-29 RX ADMIN — TETANUS TOXOID, REDUCED DIPHTHERIA TOXOID AND ACELLULAR PERTUSSIS VACCINE, ADSORBED 0.5 ML: 5; 2.5; 8; 8; 2.5 SUSPENSION INTRAMUSCULAR at 20:52

## 2020-06-29 RX ADMIN — ACETAMINOPHEN 1000 MG: 500 TABLET ORAL at 20:32

## 2020-06-29 ASSESSMENT — ENCOUNTER SYMPTOMS
COUGH: 0
RHINORRHEA: 0
COLOR CHANGE: 0
DIARRHEA: 0
EYE DISCHARGE: 0
VOMITING: 0
NAUSEA: 0
SORE THROAT: 0
EYE REDNESS: 0
SHORTNESS OF BREATH: 0

## 2020-06-29 ASSESSMENT — PAIN SCALES - GENERAL: PAINLEVEL_OUTOF10: 10

## 2020-06-29 ASSESSMENT — PAIN DESCRIPTION - LOCATION: LOCATION: FINGER (COMMENT WHICH ONE)

## 2020-06-29 NOTE — ED PROVIDER NOTES
EMERGENCY DEPARTMENT ENCOUNTER    Pt Name: Hattie Fontaine  MRN: [id number]  [place] 1990  Date of evaluation: 6/29/20  CHIEF COMPLAINT       Chief Complaint   Patient presents with    Laceration     HISTORY OF PRESENT ILLNESS   This is a 68-year-old female that presents with complaints of pain and discomfort to the distal aspect of the left long finger. Patient sustained a laceration with a . She states that she was trimming hedges and sliced the end of her finger. Her tetanus is not up-to-date, she denies any other medical issues or medical problems, she denies any other planes related to the injury. REVIEW OF SYSTEMS     Review of Systems   Constitutional: Negative for chills and fever. HENT: Negative for rhinorrhea and sore throat. Eyes: Negative for discharge, redness and visual disturbance. Respiratory: Negative for cough and shortness of breath. Cardiovascular: Negative for chest pain, palpitations and leg swelling. Gastrointestinal: Negative for diarrhea, nausea and vomiting. Skin: Positive for wound. Negative for color change and rash.      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+    Menorrhagia     PONV (postoperative nausea and vomiting)     Prolonged emergence from general anesthesia     Uterine perforation 08/2017    history of during laparoscopy    Vasodepressor syncope     cardiac eval 8/2017 (inpatient at [facility], notes in Loc)     Past Problem List  Patient Active Problem List   Diagnosis Code    History of bilateral tubal ligation Z98.51    CIN1 on colposcopy (5/18/17) Z87.410    Menorrhagia N92.0    Bradycardia R00.1    Chronic back pain M54.9, G89.29    H/O LSIL HRHPV+ pap 4/4/17 Z87.42    H/O endometriosis Z87.42    Pelvic pain in female R10.2    Menometrorrhagia N92.1    Uterine fibroid D25.9    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.710    H/O total vaginal hysterectomy Z90.710     SURGICAL HISTORY       Past Surgical History:   Procedure Laterality Date    COLPOSCOPY  05/18/2017    LORA I    HYSTERECTOMY  01/31/2018    vaginal    IL VAGINAL HYSTERECTOMY,UTERUS 250 GMS/< N/A 1/31/2018    HYSTERECTOMY VAGINAL AND REVISION OF HYMEN TEAR performed by Keshia Hitchcock MD at [address] [place] [address] SALPINGECTOMY Bilateral 08/18/2017    partial with D+C, hysteroscopy, failed NovaSure ablation    TUBAL LIGATION      WISDOM TOOTH EXTRACTION       CURRENT MEDICATIONS       Current Discharge Medication List      CONTINUE these medications which have NOT CHANGED    Details   ibuprofen (ADVIL;MOTRIN) 800 MG tablet Take 1 tablet by mouth every 8 hours as needed for Pain  Qty: 30 tablet, Refills: 0      docusate sodium (COLACE, DULCOLAX) 100 MG CAPS Take 100 mg by mouth 2 times daily  Qty: 60 capsule, Refills: 0      simethicone (MYLICON) 80 MG chewable tablet Take 1 tablet by mouth every 6 hours as needed (cramping)  Qty: 30 tablet, Refills: 0           ALLERGIES     is allergic to morphine. FAMILY HISTORY     She indicated that her mother is alive. She indicated that her father is alive. She indicated that her maternal grandmother is alive. She indicated that her maternal grandfather is alive. She indicated that her paternal grandmother is alive. She indicated that her paternal grandfather is alive.      SOCIAL HISTORY       Social History     Tobacco Use    Smoking status: Current Some Day Smoker     Packs/day: 1.00     Types: Cigarettes    Smokeless tobacco: Never Used   Substance Use Topics    Alcohol use: No    Drug use: No     PHYSICAL EXAM     INITIAL VITALS: /87   Pulse 108   Temp 97.8 °F (36.6 °C) (Oral)   Resp 18   Ht 5' 5\" (1.651 m)   Wt 117 lb (53.1 kg)   LMP 01/23/2018 (Exact Date)   SpO2 98%   BMI 19.47 kg/m²    Physical Exam  Constitutional: Appearance: Normal appearance. HENT:      Head: Normocephalic and atraumatic. Eyes:      Extraocular Movements: Extraocular movements intact. Pupils: Pupils are equal, round, and reactive to light. Cardiovascular:      Rate and Rhythm: Normal rate and regular rhythm. Pulmonary:      Effort: Pulmonary effort is normal.      Breath sounds: Normal breath sounds. Abdominal:      General: Abdomen is flat. Palpations: Abdomen is soft. Tenderness: There is no abdominal tenderness. Musculoskeletal:      Left hand: She exhibits laceration. She exhibits normal range of motion, no tenderness and no bony tenderness. Hands:    Neurological:      Mental Status: She is alert. MEDICAL DECISION MAKIN-year-old female, laceration to the distal finger. Plan is digital block and repair. CRITICAL CARE:       PROCEDURES:    Lac Repair  Date/Time: 2020 8:43 PM  Performed by: Farnaz Berry MD  Authorized by: Farnaz Berry MD     Consent:     Consent obtained:  Verbal    Consent given by:  Patient    Risks discussed:  Infection, need for additional repair, poor wound healing, poor cosmetic result, tendon damage and vascular damage    Alternatives discussed:  No treatment  Anesthesia (see MAR for exact dosages):      Anesthesia method:  Nerve block    Block location:  Digital    Block needle gauge:  27 G    Block anesthetic:  Bupivacaine 0.5% w/o epi    Block injection procedure:  Anatomic landmarks identified, introduced needle, incremental injection, anatomic landmarks palpated and negative aspiration for blood    Block outcome:  Anesthesia achieved  Laceration details:     Location:  Finger    Finger location:  L long finger    Length (cm):  1.2    Depth (mm):  3  Repair type:     Repair type:  Simple  Exploration:     Wound exploration: wound explored through full range of motion and entire depth of wound probed and visualized      Wound extent: no foreign Laceration of left middle finger without foreign body without damage to nail, initial encounter          DISPOSITION/PLAN   DISPOSITION Decision To Discharge 06/29/2020 08:41:47 PM      PATIENT REFERRED TO:  [place] [address]  [phone]  Schedule an appointment as soon as possible for a visit   For suture removal, For wound re-check    DISCHARGE MEDICATIONS:  Current Discharge Medication List        Yamila Joseph MD  Attending Emergency Physician                    Yamila Joseph MD  06/29/20 5805

## 2020-07-03 ENCOUNTER — HOSPITAL ENCOUNTER (EMERGENCY)
Facility: CLINIC | Age: 30
Discharge: HOME | End: 2020-07-03
Attending: EMERGENCY MEDICINE
Payer: MEDICARE

## 2020-07-03 VITALS
OXYGEN SATURATION: 99 % | DIASTOLIC BLOOD PRESSURE: 85 MMHG | SYSTOLIC BLOOD PRESSURE: 119 MMHG | RESPIRATION RATE: 16 BRPM | HEART RATE: 70 BPM

## 2020-07-03 LAB
-: ABNORMAL
ALBUMIN SERPL-MCNC: 4.4 G/DL (ref 3.5–5.2)
ALBUMIN/GLOBULIN RATIO: 1.7 (ref 1–2.5)
ALP BLD-CCNC: 53 U/L (ref 35–104)
ALT SERPL-CCNC: 75 U/L (ref 5–33)
AMORPHOUS: ABNORMAL
ANION GAP SERPL CALCULATED.3IONS-SCNC: 9 MMOL/L (ref 9–17)
AST SERPL-CCNC: 45 U/L
BACTERIA: ABNORMAL
BILIRUB SERPL-MCNC: 0.2 MG/DL (ref 0.3–1.2)
BILIRUBIN URINE: NEGATIVE
BUN BLDV-MCNC: 6 MG/DL (ref 6–20)
BUN/CREAT BLD: ABNORMAL (ref 9–20)
CALCIUM SERPL-MCNC: 9.6 MG/DL (ref 8.6–10.4)
CASTS UA: ABNORMAL /LPF (ref 0–2)
CHLORIDE BLD-SCNC: 102 MMOL/L (ref 98–107)
CO2: 25 MMOL/L (ref 20–31)
COLOR: YELLOW
COMMENT UA: ABNORMAL
CREAT SERPL-MCNC: 0.5 MG/DL (ref 0.5–0.9)
CRYSTALS, UA: ABNORMAL /HPF
EPITHELIAL CELLS UA: ABNORMAL /HPF (ref 0–5)
GFR AFRICAN AMERICAN: >60 ML/MIN
GFR NON-AFRICAN AMERICAN: >60 ML/MIN
GFR SERPL CREATININE-BSD FRML MDRD: ABNORMAL ML/MIN/{1.73_M2}
GFR SERPL CREATININE-BSD FRML MDRD: ABNORMAL ML/MIN/{1.73_M2}
GLUCOSE BLD-MCNC: 97 MG/DL (ref 70–99)
GLUCOSE URINE: NEGATIVE
KETONES, URINE: NEGATIVE
LEUKOCYTE ESTERASE, URINE: NEGATIVE
LIPASE: 25 U/L (ref 13–60)
MUCUS: ABNORMAL
NITRITE, URINE: NEGATIVE
OTHER OBSERVATIONS UA: ABNORMAL
PH UA: 7 (ref 5–8)
POTASSIUM SERPL-SCNC: 3.7 MMOL/L (ref 3.7–5.3)
PROTEIN UA: NEGATIVE
RBC UA: ABNORMAL /HPF (ref 0–2)
RENAL EPITHELIAL, UA: ABNORMAL /HPF
SODIUM BLD-SCNC: 136 MMOL/L (ref 135–144)
SPECIFIC GRAVITY UA: 1.02 (ref 1–1.03)
TOTAL PROTEIN: 7 G/DL (ref 6.4–8.3)
TRICHOMONAS: ABNORMAL
TURBIDITY: CLEAR
URINE HGB: ABNORMAL
UROBILINOGEN, URINE: NORMAL
WBC UA: ABNORMAL /HPF (ref 0–5)
YEAST: ABNORMAL

## 2020-07-03 PROCEDURE — 83690 ASSAY OF LIPASE: CPT

## 2020-07-03 PROCEDURE — 96374 THER/PROPH/DIAG INJ IV PUSH: CPT

## 2020-07-03 PROCEDURE — 6360000002 HC RX W HCPCS: Performed by: EMERGENCY MEDICINE

## 2020-07-03 PROCEDURE — 96375 TX/PRO/DX INJ NEW DRUG ADDON: CPT

## 2020-07-03 PROCEDURE — 99283 EMERGENCY DEPT VISIT LOW MDM: CPT

## 2020-07-03 PROCEDURE — 2580000003 HC RX 258: Performed by: EMERGENCY MEDICINE

## 2020-07-03 PROCEDURE — 81001 URINALYSIS AUTO W/SCOPE: CPT

## 2020-07-03 PROCEDURE — 80053 COMPREHEN METABOLIC PANEL: CPT

## 2020-07-03 PROCEDURE — 36415 COLL VENOUS BLD VENIPUNCTURE: CPT

## 2020-07-03 RX ORDER — 0.9 % SODIUM CHLORIDE 0.9 %
1000 INTRAVENOUS SOLUTION INTRAVENOUS ONCE
Status: COMPLETED | OUTPATIENT
Start: 2020-07-03 | End: 2020-07-03

## 2020-07-03 RX ORDER — METOCLOPRAMIDE HYDROCHLORIDE 5 MG/ML
5 INJECTION INTRAMUSCULAR; INTRAVENOUS ONCE
Status: COMPLETED | OUTPATIENT
Start: 2020-07-03 | End: 2020-07-03

## 2020-07-03 RX ORDER — DICYCLOMINE HYDROCHLORIDE 10 MG/1
10 CAPSULE ORAL 3 TIMES DAILY PRN
Qty: 15 CAPSULE | Refills: 0 | Status: SHIPPED | OUTPATIENT
Start: 2020-07-03 | End: 2021-03-08 | Stop reason: ALTCHOICE

## 2020-07-03 RX ORDER — DIPHENHYDRAMINE HYDROCHLORIDE 50 MG/ML
12.5 INJECTION INTRAMUSCULAR; INTRAVENOUS ONCE
Status: COMPLETED | OUTPATIENT
Start: 2020-07-03 | End: 2020-07-03

## 2020-07-03 RX ORDER — PROMETHAZINE HYDROCHLORIDE 25 MG/1
25 TABLET ORAL 3 TIMES DAILY PRN
Qty: 12 TABLET | Refills: 0 | Status: SHIPPED | OUTPATIENT
Start: 2020-07-03 | End: 2020-07-10

## 2020-07-03 RX ADMIN — METOCLOPRAMIDE 5 MG: 5 INJECTION, SOLUTION INTRAMUSCULAR; INTRAVENOUS at 16:26

## 2020-07-03 RX ADMIN — DIPHENHYDRAMINE HYDROCHLORIDE 12.5 MG: 50 INJECTION, SOLUTION INTRAMUSCULAR; INTRAVENOUS at 16:26

## 2020-07-03 RX ADMIN — SODIUM CHLORIDE 1000 ML: 9 INJECTION, SOLUTION INTRAVENOUS at 16:26

## 2020-07-03 ASSESSMENT — ENCOUNTER SYMPTOMS
SORE THROAT: 0
DIARRHEA: 0
VOMITING: 0
SHORTNESS OF BREATH: 0
NAUSEA: 1

## 2020-07-03 ASSESSMENT — PAIN SCALES - GENERAL: PAINLEVEL_OUTOF10: 8

## 2020-07-03 NOTE — ED PROVIDER NOTES
Partners: Male     Birth control/protection: Inserts, Injection   Lifestyle    Physical activity     Days per week: None     Minutes per session: None    Stress: None   Relationships    Social connections     Talks on phone: None     Gets together: None     Attends Worship service: None     Active member of club or organization: None     Attends meetings of clubs or organizations: None     Relationship status: None    Intimate partner violence     Fear of current or ex partner: None     Emotionally abused: None     Physically abused: None     Forced sexual activity: None   Other Topics Concern    None   Social History Narrative    None       SCREENINGS             PHYSICAL EXAM    (up to 7 for level 4, 8 or more for level 5)     ED Triage Vitals   BP Temp Temp src Pulse Resp SpO2 Height Weight   -- -- -- -- -- -- -- --       Physical Exam  Vitals signs and nursing note reviewed. Constitutional:       General: She is not in acute distress. Appearance: Normal appearance. She is not ill-appearing or toxic-appearing. HENT:      Head: Normocephalic and atraumatic. Nose: Nose normal. No congestion. Mouth/Throat:      Mouth: Mucous membranes are dry. Eyes:      General:         Right eye: No discharge. Left eye: No discharge. Conjunctiva/sclera: Conjunctivae normal.   Neck:      Musculoskeletal: Normal range of motion. Cardiovascular:      Rate and Rhythm: Normal rate and regular rhythm. Pulses: Normal pulses. Heart sounds: Normal heart sounds. No murmur. Pulmonary:      Effort: Pulmonary effort is normal. No respiratory distress. Breath sounds: Normal breath sounds. No wheezing. Abdominal:      General: Abdomen is flat. There is no distension. Palpations: Abdomen is soft. There is no mass. Tenderness: There is no abdominal tenderness. There is no guarding or rebound. Musculoskeletal: Normal range of motion.          General: No deformity or signs of injury. Skin:     General: Skin is warm and dry. Capillary Refill: Capillary refill takes less than 2 seconds. Findings: No rash. Neurological:      General: No focal deficit present. Mental Status: She is alert. Mental status is at baseline. Motor: No weakness. Comments: Speaking normally. No facial asymmetry. Moving all 4 extremities. Normal gait. Psychiatric:         Mood and Affect: Mood normal.         EMERGENCY DEPARTMENT COURSE and DIFFERENTIAL DIAGNOSIS/MDM:   Vitals:    Vitals:    07/03/20 1607   BP: 119/85   Pulse: 70   Resp: 16   SpO2: 99%       Patient presents to the emergency department with a complaint described above. Vitals are grossly normal and the patient is nontoxic. Physical examination reveals no significant abnormalities. Overall her exam is unremarkable, she is afebrile and nontoxic. I am going to get some routine blood work, I will give some fluids, some antiemetics and I will reevaluate      DIAGNOSTIC RESULTS     LABS:  Labs Reviewed   COMPREHENSIVE METABOLIC PANEL - Abnormal; Notable for the following components:       Result Value    ALT 75 (*)     AST 45 (*)     Total Bilirubin 0.20 (*)     All other components within normal limits   URINE RT REFLEX TO CULTURE - Abnormal; Notable for the following components:    Urine Hgb TRACE (*)     All other components within normal limits   MICROSCOPIC URINALYSIS - Abnormal; Notable for the following components:    Bacteria, UA FEW (*)     Other Observations UA   (*)     Value: Utilizing a urinalysis as the only screening method to exclude a potential uropathogen can be unreliable in many patient populations. Rapid screening tests are less sensitive than culture and if UTI is a clinical possibility, culture should be considered despite a negative urinalysis. All other components within normal limits   LIPASE       All other labs were within normal range or not returned as of this dictation.     RADIOLOGY:  No orders to display         ED Course as of Jul 03 1708 Fri Jul 03, 2020 1707 Labs are grossly unremarkable and there is no evidence of a urinary tract infection. At this time I am going to discharge her with a short course of Phenergan which she can use in place of Zofran and I am recommending PCP follow-up for further evaluation and treatment. She may need referral to GI if problems persist.  I did talk about what to return to the ER for    At this time the patient is without objective evidence of an acute process requiring hospitalization or inpatient management. They have remained hemodynamically stable and are stable for discharge with outpatient follow-up. Standard anticipatory guidance given to patient upon discharge. Have given them a specific time frame in which to follow-up and who to follow-up with. I have also advised them that they should return to the emergency department if they get worse, or not getting better or develop any new or concerning symptoms. Patient demonstrates understanding.    [TS]      ED Course User Index  [TS] Chito Webb DO         PROCEDURES:  Unless otherwise noted below, none     Procedures    FINAL IMPRESSION      1. Nausea          DISPOSITION/PLAN   DISPOSITION        PATIENT REFERRED TO:  Your primary care doctor  If you do not have a primary care physician or you are looking for a new physician, please contact the following number [phone] to establish one.   In 5 days        DISCHARGE MEDICATIONS:  New Prescriptions    DICYCLOMINE (BENTYL) 10 MG CAPSULE    Take 1 capsule by mouth 3 times daily as needed (stomach cramps)    PROMETHAZINE (PHENERGAN) 25 MG TABLET    Take 1 tablet by mouth 3 times daily as needed for Nausea          (Please note that portions of this note were completed with a voice recognition program.  Efforts were made to edit the dictations but occasionally words are mis-transcribed.)    Chito Webb DO (electronically signed)  Board

## 2021-03-08 ENCOUNTER — HOSPITAL ENCOUNTER (OUTPATIENT)
Age: 31
Setting detail: SPECIMEN
Discharge: HOME | End: 2021-03-08
Payer: MEDICARE

## 2021-03-08 ENCOUNTER — OFFICE VISIT (OUTPATIENT)
Dept: PRIMARY CARE CLINIC | Age: 31
End: 2021-03-08
Payer: MEDICARE

## 2021-03-08 VITALS
HEIGHT: 63 IN | HEART RATE: 87 BPM | WEIGHT: 115.2 LBS | DIASTOLIC BLOOD PRESSURE: 64 MMHG | BODY MASS INDEX: 20.41 KG/M2 | OXYGEN SATURATION: 99 % | SYSTOLIC BLOOD PRESSURE: 114 MMHG | RESPIRATION RATE: 16 BRPM

## 2021-03-08 DIAGNOSIS — Z13.0 SCREENING, ANEMIA, DEFICIENCY, IRON: ICD-10-CM

## 2021-03-08 DIAGNOSIS — Z11.59 ENCOUNTER FOR HCV SCREENING TEST FOR LOW RISK PATIENT: ICD-10-CM

## 2021-03-08 DIAGNOSIS — Z13.29 SCREENING FOR THYROID DISORDER: ICD-10-CM

## 2021-03-08 DIAGNOSIS — Z71.6 ENCOUNTER FOR SMOKING CESSATION COUNSELING: ICD-10-CM

## 2021-03-08 DIAGNOSIS — Z80.9 FAMILY HISTORY OF CANCER: ICD-10-CM

## 2021-03-08 DIAGNOSIS — Z76.89 ENCOUNTER TO ESTABLISH CARE: Primary | ICD-10-CM

## 2021-03-08 DIAGNOSIS — Z13.6 SCREENING FOR CARDIOVASCULAR CONDITION: ICD-10-CM

## 2021-03-08 DIAGNOSIS — R53.83 FATIGUE, UNSPECIFIED TYPE: ICD-10-CM

## 2021-03-08 DIAGNOSIS — Z11.4 ENCOUNTER FOR SCREENING FOR HIV: ICD-10-CM

## 2021-03-08 DIAGNOSIS — R23.3 ABNORMAL BRUISING: ICD-10-CM

## 2021-03-08 LAB
ABSOLUTE EOS #: 0.43 K/UL (ref 0–0.44)
ABSOLUTE IMMATURE GRANULOCYTE: <0.03 K/UL (ref 0–0.3)
ABSOLUTE LYMPH #: 3.51 K/UL (ref 1.1–3.7)
ABSOLUTE MONO #: 0.46 K/UL (ref 0.1–1.2)
ALBUMIN SERPL-MCNC: 4.7 G/DL (ref 3.5–5.2)
ALBUMIN/GLOBULIN RATIO: 1.6 (ref 1–2.5)
ALP BLD-CCNC: 46 U/L (ref 35–104)
ALT SERPL-CCNC: 17 U/L (ref 5–33)
ANION GAP SERPL CALCULATED.3IONS-SCNC: 11 MMOL/L (ref 9–17)
AST SERPL-CCNC: 24 U/L
BASOPHILS # BLD: 1 % (ref 0–2)
BASOPHILS ABSOLUTE: 0.07 K/UL (ref 0–0.2)
BILIRUB SERPL-MCNC: 0.27 MG/DL (ref 0.3–1.2)
BUN BLDV-MCNC: 6 MG/DL (ref 6–20)
BUN/CREAT BLD: ABNORMAL (ref 9–20)
CALCIUM SERPL-MCNC: 9.6 MG/DL (ref 8.6–10.4)
CHLORIDE BLD-SCNC: 105 MMOL/L (ref 98–107)
CHOLESTEROL/HDL RATIO: 2.9
CHOLESTEROL: 178 MG/DL
CO2: 24 MMOL/L (ref 20–31)
CREAT SERPL-MCNC: 0.53 MG/DL (ref 0.5–0.9)
DIFFERENTIAL TYPE: ABNORMAL
EOSINOPHILS RELATIVE PERCENT: 6 % (ref 1–4)
FOLATE: 15.2 NG/ML
GFR AFRICAN AMERICAN: >60 ML/MIN
GFR NON-AFRICAN AMERICAN: >60 ML/MIN
GFR SERPL CREATININE-BSD FRML MDRD: ABNORMAL ML/MIN/{1.73_M2}
GFR SERPL CREATININE-BSD FRML MDRD: ABNORMAL ML/MIN/{1.73_M2}
GLUCOSE BLD-MCNC: 86 MG/DL (ref 70–99)
HCT VFR BLD CALC: 42.1 % (ref 36.3–47.1)
HDLC SERPL-MCNC: 61 MG/DL
HEMOGLOBIN: 13.9 G/DL (ref 11.9–15.1)
HEPATITIS C ANTIBODY: NONREACTIVE
HIV AG/AB: NONREACTIVE
IMMATURE GRANULOCYTES: 0 %
LDL CHOLESTEROL: 108 MG/DL (ref 0–130)
LYMPHOCYTES # BLD: 48 % (ref 24–43)
MCH RBC QN AUTO: 30.9 PG (ref 25.2–33.5)
MCHC RBC AUTO-ENTMCNC: 33 G/DL (ref 28.4–34.8)
MCV RBC AUTO: 93.6 FL (ref 82.6–102.9)
MONOCYTES # BLD: 6 % (ref 3–12)
NRBC AUTOMATED: 0 PER 100 WBC
PDW BLD-RTO: 12.6 % (ref 11.8–14.4)
PLATELET # BLD: 256 K/UL (ref 138–453)
PLATELET ESTIMATE: ABNORMAL
PMV BLD AUTO: 11.6 FL (ref 8.1–13.5)
POTASSIUM SERPL-SCNC: 4.1 MMOL/L (ref 3.7–5.3)
RBC # BLD: 4.5 M/UL (ref 3.95–5.11)
RBC # BLD: ABNORMAL 10*6/UL
SEG NEUTROPHILS: 39 % (ref 36–65)
SEGMENTED NEUTROPHILS ABSOLUTE COUNT: 2.82 K/UL (ref 1.5–8.1)
SODIUM BLD-SCNC: 140 MMOL/L (ref 135–144)
TOTAL PROTEIN: 7.6 G/DL (ref 6.4–8.3)
TRIGL SERPL-MCNC: 45 MG/DL
TSH SERPL DL<=0.05 MIU/L-ACNC: 1.44 MIU/L (ref 0.3–5)
VITAMIN B-12: 328 PG/ML (ref 232–1245)
VITAMIN D 25-HYDROXY: 40.3 NG/ML (ref 30–100)
VLDLC SERPL CALC-MCNC: NORMAL MG/DL (ref 1–30)
WBC # BLD: 7.3 K/UL (ref 3.5–11.3)
WBC # BLD: ABNORMAL 10*3/UL

## 2021-03-08 PROCEDURE — G8427 DOCREV CUR MEDS BY ELIG CLIN: HCPCS | Performed by: PHYSICIAN ASSISTANT

## 2021-03-08 PROCEDURE — G8484 FLU IMMUNIZE NO ADMIN: HCPCS | Performed by: PHYSICIAN ASSISTANT

## 2021-03-08 PROCEDURE — 4004F PT TOBACCO SCREEN RCVD TLK: CPT | Performed by: PHYSICIAN ASSISTANT

## 2021-03-08 PROCEDURE — G8420 CALC BMI NORM PARAMETERS: HCPCS | Performed by: PHYSICIAN ASSISTANT

## 2021-03-08 PROCEDURE — 99204 OFFICE O/P NEW MOD 45 MIN: CPT | Performed by: PHYSICIAN ASSISTANT

## 2021-03-08 SDOH — ECONOMIC STABILITY: INCOME INSECURITY: HOW HARD IS IT FOR YOU TO PAY FOR THE VERY BASICS LIKE FOOD, HOUSING, MEDICAL CARE, AND HEATING?: NOT HARD AT ALL

## 2021-03-08 SDOH — ECONOMIC STABILITY: FOOD INSECURITY: WITHIN THE PAST 12 MONTHS, THE FOOD YOU BOUGHT JUST DIDN'T LAST AND YOU DIDN'T HAVE MONEY TO GET MORE.: NEVER TRUE

## 2021-03-08 ASSESSMENT — ENCOUNTER SYMPTOMS
DIARRHEA: 0
RHINORRHEA: 0
VOMITING: 0
SINUS PAIN: 0
BACK PAIN: 0
CONSTIPATION: 0
SHORTNESS OF BREATH: 0
NAUSEA: 0
ABDOMINAL PAIN: 0
COUGH: 0

## 2021-03-08 ASSESSMENT — PATIENT HEALTH QUESTIONNAIRE - PHQ9
2. FEELING DOWN, DEPRESSED OR HOPELESS: 0
SUM OF ALL RESPONSES TO PHQ QUESTIONS 1-9: 0
SUM OF ALL RESPONSES TO PHQ QUESTIONS 1-9: 0
1. LITTLE INTEREST OR PLEASURE IN DOING THINGS: 0

## 2021-03-08 NOTE — PROGRESS NOTES
[facility] PRIMARY CARE  [facility] [address]  [address] [address]  Dept: [phone]  Dept Fax: [phone]    Jessi Marcial is a 27 y.o. female who presents today for her medical conditions/complaints as noted below. Chief Complaint   Patient presents with   [place] screening, family history of lukemia, noticing increase fatigue, has lost apetite x 1month, increased bruising all over body this month, random pain on and off x 6 months when pain happens it is 10 out of 10        HPI:     Patient presents to the office to establish care. She has not had a PCP in several years. Patient is concerned for her overall health and future outcomes. She describes significant family history of various cancers including; leukemia, lung cancer, colon cancer, breast cancer. She also reports positive family history for CAD and MI. She does not currently take any medication for chronic medical illness. Today, she admits to fatigue and appetite changes for at least one month. She admits to increased stress and anxiety with family members health the recent months. She also describes incidents of scattered bruises on body, mostly legs without injury. These have resolved and are not present today. There is also random periods of painful limbs that are intermittent and spontaneous. The pain sometimes impacts daily activity. I reviewed with patient her allergies, medications, past medical history, surgical history, family history, and social history. BP stable. Weight stable.       No results found for: LABA1C          ( goal A1C is < 7)   No results found for: LABMICR  No results found for: LDLCHOLESTEROL, LDLCALC    (goal LDL is <100)   AST (U/L)   Date Value   07/03/2020 45 (H)     ALT (U/L)   Date Value   07/03/2020 75 (H)     BUN (mg/dL)   Date Value   07/03/2020 6     BP Readings from Last 3 Encounters:   03/08/21 114/64   07/03/20 119/85 Meningococcal (ACWY) vaccine  Aged Out       Subjective:      Review of Systems   Constitutional: Positive for appetite change and fatigue. Negative for chills and fever. HENT: Negative for congestion, rhinorrhea and sinus pain. Respiratory: Negative for cough and shortness of breath. Cardiovascular: Negative for chest pain and leg swelling. Gastrointestinal: Negative for abdominal pain, constipation, diarrhea, nausea and vomiting. Genitourinary: Negative for difficulty urinating, frequency and urgency. Musculoskeletal: Positive for myalgias. Negative for arthralgias and back pain. Neurological: Negative for dizziness and headaches. Hematological: Bruises/bleeds easily. Psychiatric/Behavioral: Negative for confusion, dysphoric mood and sleep disturbance. The patient is nervous/anxious. Objective:     Physical Exam  Vitals signs and nursing note reviewed. Constitutional:       General: She is not in acute distress. Appearance: Normal appearance. She is normal weight. HENT:      Head: Normocephalic. Mouth/Throat:      Mouth: Mucous membranes are moist.   Eyes:      Extraocular Movements: Extraocular movements intact. Conjunctiva/sclera: Conjunctivae normal.      Pupils: Pupils are equal, round, and reactive to light. Neck:      Musculoskeletal: Normal range of motion. Cardiovascular:      Rate and Rhythm: Normal rate and regular rhythm. Pulses: Normal pulses. Heart sounds: Normal heart sounds. Pulmonary:      Effort: Pulmonary effort is normal.      Breath sounds: Normal breath sounds. Abdominal:      General: Abdomen is flat. Bowel sounds are normal.      Palpations: Abdomen is soft. Tenderness: There is no abdominal tenderness. Musculoskeletal:      Right lower leg: No edema. Left lower leg: No edema.    Lymphadenopathy:      Head:      Right side of head: No submental, submandibular, tonsillar, preauricular, posterior auricular or occipital adenopathy. Left side of head: No submental, submandibular, tonsillar, preauricular, posterior auricular or occipital adenopathy. Cervical: No cervical adenopathy. Upper Body:      Right upper body: No supraclavicular or axillary adenopathy. Left upper body: No supraclavicular or axillary adenopathy. Skin:     General: Skin is warm. Capillary Refill: Capillary refill takes less than 2 seconds. Findings: No bruising, ecchymosis or petechiae. Comments: Looking at back, arms, face and exposed areas there are no obvious abnormal skin finding. Neurological:      General: No focal deficit present. Mental Status: She is alert and oriented to person, place, and time. Sensory: Sensation is intact. Motor: Motor function is intact. Coordination: Coordination is intact. Gait: Gait is intact. Psychiatric:         Mood and Affect: Mood is anxious. Behavior: Behavior normal.       /64 (Site: Left Upper Arm, Position: Sitting, Cuff Size: Medium Adult)   Pulse 87   Resp 16   Ht 5' 3\" (1.6 m)   Wt 115 lb 3.2 oz (52.3 kg)   LMP 01/23/2018 (Exact Date)   SpO2 99%   Breastfeeding No   BMI 20.41 kg/m²     Assessment:       ICD-10-CM    1. Encounter to establish care  Z76.89    2. Fatigue, unspecified type  R53.83 CBC Auto Differential     TSH with Reflex     Vitamin B12 & Folate     Vitamin D 25 Hydroxy   3. Abnormal bruising  R23.8    4. Screening, anemia, deficiency, iron  Z13.0 CBC Auto Differential   5. Screening for thyroid disorder  Z13.29 TSH with Reflex   6. Screening for cardiovascular condition  Z13.6 Lipid Panel     Comprehensive Metabolic Panel   7. Encounter for HCV screening test for low risk patient  Z11.59 Hepatitis C Antibody   8. Encounter for screening for HIV  Z11.4 HIV Screen   9. Family history of cancer  Z80.9    10. Encounter for smoking cessation counseling  Z71.6             Plan:       1.  Initial visit to establish care.    2. Patient with generalized fatigue and decrease appetite x 1+ months. Order several labs to rule out underlying disease. If labs within normal limits will consider possible fatigue secondary to anxiety/stress. 3. CBC to further evaluate abnormal bruising. May consider PT,PTT, bleeding time if CBC normal.    4-8. Several basic screening labs to be completed. 9. Patient with strong family history of cancer. We may consider evaluation with specialist for genetic testing. 10. We had a long discussion about the dangers of smoking/tobacco abuse on long-term health. I strongly encouraged her to consider cessation. I recommended possible assistance with Wellbutrin. Return in 2 weeks (on 3/22/2021) for discuss labs. Orders Placed This Encounter   Procedures    CBC Auto Differential     Standing Status:   Future     Number of Occurrences:   1     Standing Expiration Date:   3/8/2022    TSH with Reflex     Standing Status:   Future     Number of Occurrences:   1     Standing Expiration Date:   3/8/2022    Lipid Panel     Standing Status:   Future     Number of Occurrences:   1     Standing Expiration Date:   6/8/2021     Order Specific Question:   Is Patient Fasting?/# of Hours     Answer:    Fast 8-10 hours    Comprehensive Metabolic Panel     Standing Status:   Future     Number of Occurrences:   1     Standing Expiration Date:   3/8/2022    Vitamin B12 & Folate     Standing Status:   Future     Number of Occurrences:   1     Standing Expiration Date:   3/8/2022    Vitamin D 25 Hydroxy     Standing Status:   Future     Number of Occurrences:   1     Standing Expiration Date:   3/8/2022    HIV Screen     Standing Status:   Future     Number of Occurrences:   1     Standing Expiration Date:   3/8/2022    Hepatitis C Antibody     Standing Status:   Future     Number of Occurrences:   1     Standing Expiration Date:   3/8/2022         Patient given educational materials - see patient

## 2021-03-22 ENCOUNTER — TELEPHONE (OUTPATIENT)
Dept: PRIMARY CARE CLINIC | Age: 31
End: 2021-03-22

## 2021-08-10 ENCOUNTER — OFFICE VISIT (OUTPATIENT)
Dept: OBGYN CLINIC | Age: 31
End: 2021-08-10
Payer: MEDICARE

## 2021-08-10 ENCOUNTER — HOSPITAL ENCOUNTER (OUTPATIENT)
Age: 31
Setting detail: SPECIMEN
Discharge: HOME | End: 2021-08-10
Payer: MEDICARE

## 2021-08-10 VITALS
BODY MASS INDEX: 19.67 KG/M2 | HEIGHT: 63 IN | WEIGHT: 111 LBS | SYSTOLIC BLOOD PRESSURE: 110 MMHG | DIASTOLIC BLOOD PRESSURE: 72 MMHG

## 2021-08-10 DIAGNOSIS — R10.2 PELVIC PAIN: ICD-10-CM

## 2021-08-10 DIAGNOSIS — R23.2 HOT FLASHES: ICD-10-CM

## 2021-08-10 DIAGNOSIS — Z01.419 ENCOUNTER FOR GYNECOLOGICAL EXAMINATION WITHOUT ABNORMAL FINDING: Primary | ICD-10-CM

## 2021-08-10 DIAGNOSIS — N89.8 VAGINAL DISCHARGE: ICD-10-CM

## 2021-08-10 DIAGNOSIS — N94.10 DYSPAREUNIA IN FEMALE: ICD-10-CM

## 2021-08-10 LAB
DIRECT EXAM: NORMAL
ESTRADIOL LEVEL: 92 PG/ML (ref 27–314)
FOLLICLE STIMULATING HORMONE: 3.1 U/L (ref 1.7–21.5)
LH: 3.8 U/L (ref 1–95.6)
Lab: NORMAL
SPECIMEN DESCRIPTION: NORMAL

## 2021-08-10 PROCEDURE — G8427 DOCREV CUR MEDS BY ELIG CLIN: HCPCS | Performed by: OBSTETRICS & GYNECOLOGY

## 2021-08-10 PROCEDURE — 99214 OFFICE O/P EST MOD 30 MIN: CPT | Performed by: OBSTETRICS & GYNECOLOGY

## 2021-08-10 PROCEDURE — 4004F PT TOBACCO SCREEN RCVD TLK: CPT | Performed by: OBSTETRICS & GYNECOLOGY

## 2021-08-10 PROCEDURE — G8420 CALC BMI NORM PARAMETERS: HCPCS | Performed by: OBSTETRICS & GYNECOLOGY

## 2021-08-10 NOTE — PATIENT INSTRUCTIONS
We will check a series of hormone levels and contact you with those results as well as today's Pap smear. We will contact you with results of the culture from today and if any treatment is necessary we will send that to your pharmacy. A pelvic ultrasound will be ordered and we will make recommendations for follow-up pending those results. Please read the information given to you on laparoscopy. You may also want to get more information from the [employer] of obstetrics and gynecology or [employer] websites. Patient Education        Laparoscopy: Before Your Surgery  What is laparoscopy? Laparoscopy (say \"fpj-cc-DFUP-kuh-pee\") is a type of surgery that uses very small cuts. These cuts are called incisions. The doctor puts a lighted tube through incisions in your belly. This tube is called a scope. Then the doctor puts special tools through the tube to do the surgery. The surgery may be done to diagnose a condition, repair or remove an organ, or see if cancer has spread. For some surgeries, you can usually go home the same day. The incisions from the surgery usually leave several scars about half an inch long. Follow-up care is a key part of your treatment and safety. Be sure to make and go to all appointments, and call your doctor if you are having problems. It's also a good idea to know your test results and keep a list of the medicines you take. How do you prepare for surgery? Surgery can be stressful. This information will help you understand what you can expect. And it will help you safely prepare for surgery. Preparing for surgery    · You may need to empty your colon with an enema or laxative. Your doctor will tell you how to do this.     · Be sure you have someone to take you home.  Anesthesia and pain medicine will make it unsafe for you to drive or get home on your own.     · Understand exactly what surgery is planned, along with the risks, benefits, and other options.     · Tell your doctor ALL the medicines, vitamins, supplements, and herbal remedies you take. Some may increase the risk of problems during your surgery. Your doctor will tell you if you should stop taking any of them before the surgery and how soon to do it.     · If you take aspirin or some other blood thinner, ask your doctor if you should stop taking it before your surgery. Make sure that you understand exactly what your doctor wants you to do. These medicines increase the risk of bleeding.     · Make sure your doctor and the hospital have a copy of your advance directive. If you don't have one, you may want to prepare one. It lets others know your health care wishes. It's a good thing to have before any type of surgery or procedure. What happens on the day of surgery? · Follow the instructions exactly about when to stop eating and drinking. If you don't, your surgery may be canceled. If your doctor told you to take your medicines on the day of surgery, take them with only a sip of water.     · Take a bath or shower before you come in for your surgery. Do not apply lotions, perfumes, deodorants, or nail polish.     · Do not shave the surgical site yourself.     · Take off all jewelry and piercings. And take out contact lenses, if you wear them. At the hospital or surgery center   · Bring a picture ID.     · The area for surgery is often marked to make sure there are no errors.     · You will be kept comfortable and safe by your anesthesia provider. You will be asleep during the surgery.     · The surgery may take about 1 to 4 hours. It depends on what needs to be done. Hernia surgery may take 1 to 2 hours. But surgery to remove the spleen or part of the bowel may take 3 to 4 hours. When should you call your doctor?    · You have questions or concerns.     · You don't understand how to prepare for your surgery.     · You become ill before the surgery (such as fever, flu, or a cold).     · You need to reschedule or have changed your mind about having the surgery. Where can you learn more? Go to [email or web address] and sign in to your [employer] account. Enter T345 in the [employer] box to learn more about \"Laparoscopy: Before Your Surgery. \"     If you do not have an account, please click on the \"Sign Up Now\" link. Current as of: February 10, 2021               Content Version: 12.9  © 2006-2021 Healthwise, Inc[employer]. Care instructions adapted under license by [facility] [address] If you have questions about a medical condition or this instruction, always ask your healthcare professional. [address] any warranty or liability for your use of this information.

## 2021-08-10 NOTE — PROGRESS NOTES
DATE OF VISIT:  8/10/21        History and Physical    Zak Ambrocio    :  1990  CHIEF COMPLAINT:    Chief Complaint   Patient presents with    Annual Exam     Here for annual Last pap 19neg Hyst hot flashes wants cultures  having pelvic pain/pain with intercourse                    HPI :   Zak Ambrocio is a 32 y.o. femaleGRAVIDA 4 PARA     Zak Ambrocio returns today for her annual exam.  She has been briefly lost to follow-up since her pos ED visit in 2019 for LLQ pain and a complex 6 cm left ovarian cyst on ultrasound. She presents today with multiple complaints. She states that she has been having painful intercourse since her TVH. The pain seems somewhat generalized but primarily begins in the lower left quadrant, is sharp and radiates across the entire pelvis. She is having regular bowel movements without constipation or diarrhea. She denies hematuria. She denies any UTI symptoms, hematuria or involuntary loss of urine. Erich Angelucci also complains of having sweats and occasional chills.  She is otherwise without any significant complaints today.  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+    Menorrhagia     PONV (postoperative nausea and vomiting)     Prolonged emergence from general anesthesia     Uterine perforation 2017    history of during laparoscopy    Vasodepressor syncope     cardiac eval 2017 (inpatient at [facility], notes in [address])                                                                   Past Surgical History:   Procedure Laterality Date    COLPOSCOPY  2017    LORA I    HYSTERECTOMY  2018    vaginal    [place] VAGINAL HYSTERECTOMY,UTERUS 250 GMS/< N/A 2018    HYSTERECTOMY VAGINAL AND REVISION OF HYMEN TEAR performed by Sandy Clarke Denice Harrison MD at [address] [facility] SALPINGECTOMY Bilateral 2017    partial with D+C, hysteroscopy, failed NovaSure ablation    TUBAL LIGATION      WISDOM TOOTH EXTRACTION       Family History   Problem Relation Age of Onset    Other Mother         problems with irreg cycles    Heart Disease Father     Other Father         heart attack    Diabetes Maternal Grandmother      Social History     Tobacco Use   Smoking Status Current Some Day Smoker    Packs/day: 1.00    Types: Cigarettes   Smokeless Tobacco Never Used     Social History     Substance and Sexual Activity   Alcohol Use No     No current outpatient medications on file. No current facility-administered medications for this visit. Allergies: Allergies   Allergen Reactions    Morphine Other (See Comments)     phlebitis       Gynecologic History:  Patient's last menstrual period was 2018 (exact date).   Sexually Active: Yes  STD History: No  Abnormal Pap History yes  Birth Control: No    OB History    Para Term  AB Living   4 2 2 0 2 2   SAB TAB Ectopic Molar Multiple Live Births   0 0 0 0 0 2     ______________________________________________________________________  REVIEW OF SYSTEMS:        Constitutional:  Unexpected weight change no  Neurological:  Frequent headaches  no  Ophthalmic:  Recent visual changes no  ENT:   Difficulty swallowing  no  Breast:              Masses   no     Respiratory:  Shortness of breath  no    Cardiovascular: Chest pain   no     Gastrointestinal: Chronic diarrhea/constipation no   Urogenital:  Urinary incontinence  no                                         Heavy/irregular periods           no                                      Vaginal discharge                   yes  Hematological: Bruises easy   no     Endocrine:  Nipple Discharge  no     Hot/Cold Intolerance  yes   Psychological:  Mood and affect were wnl yes Physical Exam:    Vitals:    08/10/21 1055   BP: 110/72   Site: Right Upper Arm   Position: Sitting   Cuff Size: Medium Adult   Weight: 111 lb (50.3 kg)   Height: 5' 3\" (1.6 m)       General Appearance:  She does not appear to be in any distress. This  is a well developed, well nourished, well groomed female. Neurological:  The patient is alert and oriented to time, place, person, and situation without any noted sensory motor deficits. Skin:  A brief inspection of the skin revealed no rashes or lesions. Neck:  The neck was supple. There is no tracheal deviation, thyromegaly or supraclavicular adenopathy appreciated. Breast:   The patients breasts were symmetrical.  Breasts are nontender and there  were no masses, discharge or pathologic skin changes. There is no supraclavicular or axillary adenopathy bilaterally. Respiratory: There was unlabored respiratory effort. Lungs clear to ascultation without wheezes, rales or rhonchi in all fields bilaterally. Cardiovascular:  Normal sinus rhythm with a regular rate and without murmur, rubs or gallops. Abdomen: The abdomen was soft and non-tender with no guarding, rebound, CVAT or rigidity. No hernias were appreciated. Bowel sounds were normally active. Pelvic exam:  No vulvar or vaginal lesions are noted. Good support of the apex and no foreshortening present. No significant cystocele, rectocele or enterocele noted. Uterus surgically absent. On bimanual there is no nodularity but she is tender mostly at the apex and LLQ. Adnexa nontender and without abnormal masses bilaterally. Extremities:  FROM and nontender without clubbing cyanosis or edema. ASSESSMENT:         ICD-10-CM    1. Encounter for gynecological examination without abnormal finding  Z01.419 PAP SMEAR   2. Pelvic pain  R10.2 C.trachomatis N.gonorrhoeae DNA     VAGINITIS DNA PROBE   3.  Hot flashes  R23.2 Follicle Stimulating Hormone     Luteinizing Hormone     Estradiol   4. Vaginal discharge  N89.8 C.trachomatis N.gonorrhoeae DNA     VAGINITIS DNA PROBE   5. Dyspareunia in female  N94.10                    PLAN:  Pelvic ultrasound ordered. Discussed probability of diagnostic laparoscopy and she was given information and website reference. Return to the office in 1 year or when necessary    Dwain Jose M.D., [address] [facility]  [place]

## 2021-08-11 LAB
C TRACH DNA GENITAL QL NAA+PROBE: NEGATIVE
N. GONORRHOEAE DNA: NEGATIVE
SPECIMEN DESCRIPTION: NORMAL

## 2021-08-12 RX ORDER — ONDANSETRON 4 MG/1
4 TABLET, ORALLY DISINTEGRATING ORAL EVERY 8 HOURS PRN
Qty: 21 TABLET | Refills: 0 | Status: SHIPPED | OUTPATIENT
Start: 2021-08-12 | End: 2021-12-21 | Stop reason: SDUPTHER

## 2021-08-16 ENCOUNTER — TELEPHONE (OUTPATIENT)
Dept: OBGYN CLINIC | Age: 31
End: 2021-08-16

## 2021-08-16 NOTE — TELEPHONE ENCOUNTER
Pt called she has her US done here in the office last Thursday she is wondering if we received the results

## 2021-08-20 LAB — CYTOLOGY REPORT: NORMAL

## 2021-11-02 ENCOUNTER — OFFICE VISIT (OUTPATIENT)
Dept: ORTHOPEDIC SURGERY | Age: 31
End: 2021-11-02
Payer: MEDICARE

## 2021-11-02 VITALS — HEIGHT: 63 IN | BODY MASS INDEX: 19.67 KG/M2 | WEIGHT: 111 LBS

## 2021-11-02 DIAGNOSIS — M25.562 ACUTE PAIN OF LEFT KNEE: Primary | ICD-10-CM

## 2021-11-02 DIAGNOSIS — M22.42 PATELLOFEMORAL CHONDROSIS OF LEFT KNEE: ICD-10-CM

## 2021-11-02 PROCEDURE — G8427 DOCREV CUR MEDS BY ELIG CLIN: HCPCS | Performed by: PHYSICIAN ASSISTANT

## 2021-11-02 PROCEDURE — 99204 OFFICE O/P NEW MOD 45 MIN: CPT | Performed by: PHYSICIAN ASSISTANT

## 2021-11-02 PROCEDURE — G8420 CALC BMI NORM PARAMETERS: HCPCS | Performed by: PHYSICIAN ASSISTANT

## 2021-11-02 PROCEDURE — G8484 FLU IMMUNIZE NO ADMIN: HCPCS | Performed by: PHYSICIAN ASSISTANT

## 2021-11-02 PROCEDURE — 1036F TOBACCO NON-USER: CPT | Performed by: PHYSICIAN ASSISTANT

## 2021-11-02 RX ORDER — DICLOFENAC SODIUM 75 MG/1
75 TABLET, DELAYED RELEASE ORAL 2 TIMES DAILY WITH MEALS
Qty: 28 TABLET | Refills: 0 | Status: SHIPPED | OUTPATIENT
Start: 2021-11-02 | End: 2022-04-25

## 2021-11-02 NOTE — PROGRESS NOTES
[facility], [address] [facility], [address] [place], [place] [address]Noe Ragilmar 81.           Dept Phone: [phone]           Dept Fax:  [address], [place]          Dept Phone: [phone]           Dept Fax:  [phone]      Chief Compliant:  Chief Complaint   Patient presents with    New Patient     L Knee pain        History of Present Illness: This is a 32 y.o. female who presents to the clinic today for evaluation of had concerns including New Patient (L Knee pain). [facility] is a pleasant 78-year-old female presents for evaluation of 2-month history left knee pain. Patient cannot recall any mechanism of injury. She reports pain started about 2 months ago however the last 3 weeks it has been quite a bit worse. Pain increased even more so last weekend when the knee \"gave out\" when trick-or-treating. Patient reports pain is most severe to the anterior knee. Pain is aggravated by any distance of walking. She reports however pain does improve at rest but continues to have pain describes it as a \"achy\" feeling when not walking but once she starts walking patient has sharp and sharper pain the longer distance she walks. Patient without any knee joint warmth, redness, fever or chills. No significant swelling noted. Patient without history of surgery on this knee no recent physical therapy.        Past History:    Current Outpatient Medications:     ondansetron (ZOFRAN-ODT) 4 MG disintegrating tablet, Take 1 tablet by mouth every 8 hours as needed for Nausea or Vomiting, Disp: 21 tablet, Rfl: 0  Allergies   Allergen Reactions    Morphine Other (See Comments)     phlebitis     Social History     Socioeconomic History    Marital status: Single     Spouse name: Not on file    Number of children: Not on file    Years of education: (inpatient at [address], notes in [place])     Past Surgical History:   Procedure Laterality Date    COLPOSCOPY  05/18/2017    LORA I    HYSTERECTOMY  01/31/2018    vaginal    [place] VAGINAL HYSTERECTOMY,UTERUS 250 GMS/< N/A 1/31/2018    HYSTERECTOMY VAGINAL AND REVISION OF HYMEN TEAR performed by Iris Toribio MD at [address] SALPINGECTOMY Bilateral 08/18/2017    partial with D+C, hysteroscopy, failed NovaSure ablation    TUBAL LIGATION      WISDOM TOOTH EXTRACTION       Family History   Problem Relation Age of Onset    Other Mother         problems with irreg cycles    Heart Disease Father     Other Father         heart attack    Diabetes Maternal Grandmother         Review of Systems   Constitutional: Negative for fever, chills, sweats. Eyes: Negative for changes in vision, or pain. HENT: Negative for ear ache, epistaxis, or sore throat. Respiratory/Cardio: Negative for Chest pain, palpitations, SOB, or cough. Gastrointestinal: Negative for abdominal pain, N/V/D. Genitourinary: Negative for dysuria, frequency, urgency, or hematuria. Neurological: Negative for headache, numbness, or weakness. Integumentary: Negative for rash, itching, laceration, or abrasion. Musculoskeletal: Positive for New Patient (L Knee pain)       Physical Exam:  Constitutional: Patient is oriented to person, place, and time. Patient appears well-developed and well nourished. HENT: Negative otherwise noted  Head: Normocephalic and Atraumatic  Nose: Normal  Eyes: Conjunctivae and EOM are normal  Neck: Normal range of motion Neck supple. Respiratory/Cardio: Effort normal. No respiratory distress. Musculoskeletal:    Left Knee:     Skin: warm and dry, no rash or erythema  Vasculature: 2+ pedal pulses bilaterally  Neuro: Sensation grossly intact to light touch diffusely  Alignment: Normal  Tenderness: No tenderness to either joint line. Mild tenderness to medial border the patella.   No tenderness to quad/patellar tendon, pes anserine bursa or posterior knee. Effusion: small    ROM: (Degrees)       A P       Extension  0 0       Flexion   125 125       Crepitation  Yes       Muscle strength:         Flexion   5      Extension  5      SLR   5        Extensor lag   y          Special testing:  y    Pain with deep knee flexion     n    Patellar grind       y    Patellar apprehension      y    Patellar glide         n    Lachman       n    Anterior drawer      n    Pivot shift       n    Posterior drawer      n    Dial test       n    Posterolateral drawer      n    Posterior Sag       n    MCL        n    LCL          n    Medial joint line tenderness     n    Lateral joint line tenderness     n    McMurrey's         Neurological: Patient is alert and oriented to person, place, and time. Normal strenght. No sensory deficit. Skin: Skin is warm and dry  Psychiatric: Behavior is normal. Thought content normal.  Nursing note and vitals reviewed. Labs and Imaging:       X-rays taken in clinic today and preliminarily reviewed by me:  3 views of the left knee taken in clinic on 11/2/2021 demonstrate anatomic alignment. Joint spaces are maintained without any significant degenerative changes. No fracture, subluxation or dislocation noted. Orders Placed This Encounter   Procedures    XR KNEE LEFT (3 VIEWS)     Standing Status:   Future     Number of Occurrences:   1     Standing Expiration Date:   11/1/2022       Assessment and Plan:  1. Acute pain of left knee    2. Patellofemoral chondrosis of left knee          PLAN:  Mohini Morales is a 32 y.o. old female who presents to the clinic today for evaluation of left knee pain concerning for patellofemoral pain syndrome. Considered in the differential are collateral/cruciate ligament sprain versus tear, meniscal injury, septic joint and fracture. There is no evidence of fracture or degenerative changes on radiographs today.   No evidence of ligamentous laxity or meniscal pathology on exam.  Pain is most consistent with patellofemoral pain syndrome. 1. We discussed treatment options available to her, including nonoperative and operative intervention. 2. At this time I believe she will benefit from conservative management. 3. Consequently, a prescription for therapy was provided and a prescription for Voltaren 75 mg to be taken twice a day with meals over the course of the next 2 weeks was electronically sent to her pharmacy. 4. I'll see her back my clinic in 6 weeks for reevaluation but she was encouraged to return or call earlier with questions and/or concerns. Electronically signed by JONATAN Martinez on 11/2/21 at 10:09 AM EDT        Please note that this chart was generated using voice recognition Dragon dictation software. Although every effort was made to ensure the accuracy of this automated transcription, some errors in transcription may have occurred.

## 2021-12-21 ENCOUNTER — OFFICE VISIT (OUTPATIENT)
Dept: PRIMARY CARE CLINIC | Age: 31
End: 2021-12-21
Payer: MEDICARE

## 2021-12-21 ENCOUNTER — HOSPITAL ENCOUNTER (OUTPATIENT)
Age: 31
Setting detail: SPECIMEN
Discharge: HOME | End: 2021-12-21

## 2021-12-21 VITALS
HEART RATE: 91 BPM | RESPIRATION RATE: 16 BRPM | WEIGHT: 110.4 LBS | SYSTOLIC BLOOD PRESSURE: 102 MMHG | OXYGEN SATURATION: 98 % | BODY MASS INDEX: 19.56 KG/M2 | HEIGHT: 63 IN | DIASTOLIC BLOOD PRESSURE: 70 MMHG

## 2021-12-21 DIAGNOSIS — R11.0 NAUSEA: ICD-10-CM

## 2021-12-21 DIAGNOSIS — M25.50 ARTHRALGIA, UNSPECIFIED JOINT: ICD-10-CM

## 2021-12-21 DIAGNOSIS — R53.83 FATIGUE, UNSPECIFIED TYPE: Primary | ICD-10-CM

## 2021-12-21 DIAGNOSIS — R53.83 FATIGUE, UNSPECIFIED TYPE: ICD-10-CM

## 2021-12-21 DIAGNOSIS — G89.29 CHRONIC GENERALIZED ABDOMINAL PAIN: ICD-10-CM

## 2021-12-21 DIAGNOSIS — R10.84 CHRONIC GENERALIZED ABDOMINAL PAIN: ICD-10-CM

## 2021-12-21 LAB
C-REACTIVE PROTEIN: <3 MG/L (ref 0–5)
RHEUMATOID FACTOR: <10 IU/ML
SEDIMENTATION RATE, ERYTHROCYTE: 2 MM (ref 0–20)

## 2021-12-21 PROCEDURE — G8427 DOCREV CUR MEDS BY ELIG CLIN: HCPCS | Performed by: PHYSICIAN ASSISTANT

## 2021-12-21 PROCEDURE — 1036F TOBACCO NON-USER: CPT | Performed by: PHYSICIAN ASSISTANT

## 2021-12-21 PROCEDURE — 99214 OFFICE O/P EST MOD 30 MIN: CPT | Performed by: PHYSICIAN ASSISTANT

## 2021-12-21 PROCEDURE — G8420 CALC BMI NORM PARAMETERS: HCPCS | Performed by: PHYSICIAN ASSISTANT

## 2021-12-21 PROCEDURE — G8484 FLU IMMUNIZE NO ADMIN: HCPCS | Performed by: PHYSICIAN ASSISTANT

## 2021-12-21 RX ORDER — CELECOXIB 100 MG/1
100 CAPSULE ORAL 2 TIMES DAILY
Qty: 60 CAPSULE | Refills: 0 | Status: SHIPPED | OUTPATIENT
Start: 2021-12-21 | End: 2022-04-25

## 2021-12-21 RX ORDER — ONDANSETRON 4 MG/1
4 TABLET, ORALLY DISINTEGRATING ORAL EVERY 8 HOURS PRN
Qty: 21 TABLET | Refills: 0 | Status: SHIPPED | OUTPATIENT
Start: 2021-12-21

## 2021-12-21 NOTE — PROGRESS NOTES
[facility] PRIMARY CARE  [facility] [address] [facility] [address]  [address] [address]  Dept: [phone]  Dept Fax: [phone]    Johanna Williamson is a 32 y.o. female who presents today for her medical conditions/complaints as noted below. Chief Complaint   Patient presents with    Other     decreased appetite, abdominal pain was seen by  Dr. Miguel Goldman advised to see PCP, having on/off bulging feeling of stomach    Pain     noticing deeps throbbing pain on/off, constantly taking Tylenol and Ibuprofen with no relief       HPI:     Patient presents the office for evaluation of ongoing concerns. Patient describes recurrent, intermittent abdominal pain/pelvic pain. She also has associated decreased appetite and nausea and feeling bulge/distention of stomach. She was evaluated by her [facility] gynecologist who ordered pelvic ultrasound which was negative. They recommended following PCP. Patient also has complaints of joint and muscle pains widespread. She reports waking up feeling achy and stiff nearly every morning. She relates that she is constantly fatigued and feels drained. She reports her anxiety is not significantly impacting life. Patient is taking Tylenol and ibuprofen on a regular basis for pain. No other treatment. BP stable. Weight stable.         No results found for: LABA1C          ( goal A1C is < 7)   No results found for: LABMICR  LDL Cholesterol (mg/dL)   Date Value   2021 108       (goal LDL is <100)   AST (U/L)   Date Value   2021 24     ALT (U/L)   Date Value   2021 17     BUN (mg/dL)   Date Value   2021 6     BP Readings from Last 3 Encounters:   21 102/70   08/10/21 110/72   21 114/64          (goal 120/80)    Past Medical History:   Diagnosis Date    Bradycardia     Cancer (HCC)     Cervical cancer (HCC)     Chronic back pain     LGSIL of cervix of undetermined significance 2017    HPV+    Menorrhagia  PONV (postoperative nausea and vomiting)     Prolonged emergence from general anesthesia     Uterine perforation 2017    history of during laparoscopy    Vasodepressor syncope     cardiac eval 2017 (inpatient at [facility], notes in [address])      Past Surgical History:   Procedure Laterality Date    COLPOSCOPY  2017    LORA I    HYSTERECTOMY  2018    vaginal    [place] VAGINAL HYSTERECTOMY,UTERUS 250 GMS/< N/A 2018    HYSTERECTOMY VAGINAL AND REVISION OF HYMEN TEAR performed by Dione Bell MD at [address] SALPINGECTOMY Bilateral 2017    partial with D+C, hysteroscopy, failed NovaSure ablation    TUBAL LIGATION      WISDOM TOOTH EXTRACTION         Family History   Problem Relation Age of Onset    Other Mother         problems with irreg cycles    Heart Disease Father     Other Father         heart attack    Diabetes Maternal Grandmother        Social History     Tobacco Use    Smoking status: Former Smoker     Packs/day: 1.00     Types: Cigarettes     Quit date: 10/11/2021     Years since quittin.1    Smokeless tobacco: Never Used   Substance Use Topics    Alcohol use: No      Current Outpatient Medications   Medication Sig Dispense Refill    ondansetron (ZOFRAN-ODT) 4 MG disintegrating tablet Take 1 tablet by mouth every 8 hours as needed for Nausea or Vomiting 21 tablet 0    celecoxib (CELEBREX) 100 MG capsule Take 1 capsule by mouth 2 times daily 60 capsule 0    diclofenac (VOLTAREN) 75 MG EC tablet Take 1 tablet by mouth 2 times daily (with meals) for 14 days 28 tablet 0     No current facility-administered medications for this visit.      Allergies   Allergen Reactions    Morphine Other (See Comments)     phlebitis       Health Maintenance   Topic Date Due    Varicella vaccine (1 of 2 - 2-dose childhood series) Never done    COVID-19 Vaccine (1) Never done    Flu vaccine (1) 2022 (Originally 2021)    Cervical cancer screen  08/10/2024    DTaP/Tdap/Td vaccine (3 - Td or Tdap) 06/29/2030    Hepatitis B vaccine  Completed    Hepatitis C screen  Completed    HIV screen  Completed    Hepatitis A vaccine  Aged Out    Hib vaccine  Aged Out    Meningococcal (ACWY) vaccine  Aged Out    Pneumococcal 0-64 years Vaccine  Aged Out       Subjective:      Review of Systems   Constitutional: Positive for appetite change and fatigue. Negative for chills and fever. HENT: Negative for congestion, rhinorrhea and sinus pain. Respiratory: Negative for cough and shortness of breath. Cardiovascular: Negative for chest pain and leg swelling. Gastrointestinal: Positive for abdominal pain and nausea. Negative for constipation, diarrhea and vomiting. Genitourinary: Negative for difficulty urinating, frequency and urgency. Musculoskeletal: Positive for arthralgias and myalgias. Negative for back pain. Neurological: Negative for dizziness and headaches. Psychiatric/Behavioral: Negative for confusion, dysphoric mood and sleep disturbance. The patient is nervous/anxious. Objective:     Physical Exam  Vitals and nursing note reviewed. Constitutional:       General: She is not in acute distress. Appearance: Normal appearance. She is normal weight. HENT:      Head: Normocephalic. Mouth/Throat:      Mouth: Mucous membranes are moist.   Eyes:      Extraocular Movements: Extraocular movements intact. Conjunctiva/sclera: Conjunctivae normal.      Pupils: Pupils are equal, round, and reactive to light. Cardiovascular:      Rate and Rhythm: Normal rate and regular rhythm. Pulses: Normal pulses. Heart sounds: Normal heart sounds. Pulmonary:      Effort: Pulmonary effort is normal. No respiratory distress. Breath sounds: Normal breath sounds. No wheezing. Abdominal:      General: Abdomen is flat. Bowel sounds are normal. There is no distension. Palpations: Abdomen is soft. There is no mass. Tenderness:  There is no abdominal tenderness. There is no right CVA tenderness, left CVA tenderness, guarding or rebound. Hernia: No hernia is present. Musculoskeletal:      Cervical back: Normal range of motion. Right lower leg: No edema. Left lower leg: No edema. Lymphadenopathy:      Cervical: No cervical adenopathy. Skin:     General: Skin is warm. Capillary Refill: Capillary refill takes less than 2 seconds. Neurological:      General: No focal deficit present. Mental Status: She is alert and oriented to person, place, and time. Psychiatric:         Mood and Affect: Mood normal.         Behavior: Behavior normal.       /70 (Site: Left Upper Arm, Position: Sitting, Cuff Size: Medium Adult)   Pulse 91   Resp 16   Ht 5' 3\" (1.6 m)   Wt 110 lb 6.4 oz (50.1 kg)   LMP 01/23/2018 (Exact Date)   SpO2 98%   Breastfeeding No   BMI 19.56 kg/m²     Assessment:       ICD-10-CM    1. Fatigue, unspecified type  R53.83 ANGEL Screen With Reflex     C-Reactive Protein     Rheumatoid Factor     HLA-B27 Antigen     Sedimentation Rate   2. Arthralgia, unspecified joint  M25.50 ANGEL Screen With Reflex     C-Reactive Protein     Rheumatoid Factor     HLA-B27 Antigen     Sedimentation Rate     celecoxib (CELEBREX) 100 MG capsule   3. Chronic generalized abdominal pain  R10.84 CT ABDOMEN PELVIS W IV CONTRAST Additional Contrast? Radiologist Recommendation    G89.29    4. Nausea  R11.0 CT ABDOMEN PELVIS W IV CONTRAST Additional Contrast? Radiologist Recommendation     ondansetron (ZOFRAN-ODT) 4 MG disintegrating tablet            Plan:       1,2. Patient with recurrent arthralgias and fatigue. Plan to rule out autoimmune/rheumatological etiology. 3, 4. Patient with recurrent abdominal pain and nausea. She is given prescription of Zofran to use as needed. Also plan for CT abdomen/pelvis. I discussed with patient possibility of anxiety causing physical symptoms. However will rule out organic cause.   We

## 2021-12-22 LAB
ANTI DNA DOUBLE STRANDED: 1 IU/ML
ANTI-NUCLEAR ANTIBODY (ANA): NEGATIVE
ENA ANTIBODIES SCREEN: 0.2 U/ML

## 2021-12-22 ASSESSMENT — ENCOUNTER SYMPTOMS
ABDOMINAL PAIN: 1
BACK PAIN: 0
SINUS PAIN: 0
DIARRHEA: 0
SHORTNESS OF BREATH: 0
RHINORRHEA: 0
NAUSEA: 1
CONSTIPATION: 0
COUGH: 0
VOMITING: 0

## 2021-12-23 LAB — HLA B27: NEGATIVE

## 2022-01-04 ENCOUNTER — HOSPITAL ENCOUNTER (OUTPATIENT)
Dept: CT IMAGING | Age: 32
Discharge: HOME | End: 2022-01-06
Payer: MEDICARE

## 2022-01-04 DIAGNOSIS — R10.84 CHRONIC GENERALIZED ABDOMINAL PAIN: ICD-10-CM

## 2022-01-04 DIAGNOSIS — G89.29 CHRONIC GENERALIZED ABDOMINAL PAIN: ICD-10-CM

## 2022-01-04 DIAGNOSIS — R11.0 NAUSEA: ICD-10-CM

## 2022-01-04 PROCEDURE — 2580000003 HC RX 258: Performed by: PHYSICIAN ASSISTANT

## 2022-01-04 PROCEDURE — 6360000004 HC RX CONTRAST MEDICATION: Performed by: PHYSICIAN ASSISTANT

## 2022-01-04 PROCEDURE — 74177 CT ABD & PELVIS W/CONTRAST: CPT

## 2022-01-04 RX ORDER — SODIUM CHLORIDE 0.9 % (FLUSH) 0.9 %
10 SYRINGE (ML) INJECTION ONCE
Status: COMPLETED | OUTPATIENT
Start: 2022-01-04 | End: 2022-01-04

## 2022-01-04 RX ORDER — 0.9 % SODIUM CHLORIDE 0.9 %
80 INTRAVENOUS SOLUTION INTRAVENOUS ONCE
Status: COMPLETED | OUTPATIENT
Start: 2022-01-04 | End: 2022-01-04

## 2022-01-04 RX ADMIN — IOHEXOL 30 ML: 300 INJECTION, SOLUTION INTRAVENOUS at 15:26

## 2022-01-04 RX ADMIN — IOPAMIDOL 75 ML: 755 INJECTION, SOLUTION INTRAVENOUS at 15:26

## 2022-01-04 RX ADMIN — SODIUM CHLORIDE 80 ML: 9 INJECTION, SOLUTION INTRAVENOUS at 15:26

## 2022-01-04 RX ADMIN — SODIUM CHLORIDE, PRESERVATIVE FREE 10 ML: 5 INJECTION INTRAVENOUS at 15:27

## 2022-02-17 ENCOUNTER — TELEPHONE (OUTPATIENT)
Dept: PRIMARY CARE CLINIC | Age: 32
End: 2022-02-17

## 2022-02-17 NOTE — LETTER
[facility] Primary Care  [address] [facility] [address]  Phone: [phone]  Fax: [address] [place], [place]        February 17, 2022     [address] [facility] [address] [place] [id number]      Dear Gretchen Rene: We missed seeing you for a scheduled appointment at [address] with Rosy Rosario PA-C on 2/17/2022. We're sorry you were unable to keep your appointment and hope that you are doing well. We ask that you please call 24 hours in advance if you are unable to make your appointment, so that we can give that time to another patient in need. We care about you and the management of your healthcare and want to make sure that you follow up as recommended. To provide quality care and timely appointments to all our patients, you may be dismissed from the practice if you do not show for three (3) scheduled appointments within a 12-month period. We would like to continue treating your healthcare needs. Please call the office to reschedule your appointment, if needed.      Sincerely,        Rosy Rosario PA-C

## 2022-04-12 ENCOUNTER — TELEPHONE (OUTPATIENT)
Dept: PRIMARY CARE CLINIC | Age: 32
End: 2022-04-12

## 2022-04-12 NOTE — LETTER
[facility] Primary Care  [address] [facility]  Phone: [phone]  Fax: [phone] Micheal Mccarty PA-C        April 12, 2022     Kishor Dylan  [place] [address] [address] [facility] [address]      Dear Rupal Retana: We missed seeing you for a scheduled appointment at [address] with Sonny Maynard PA-C on 4/12/2022. We're sorry you were unable to keep your appointment and hope that you are doing well. We ask that you please call 24 hours in advance if you are unable to make your appointment, so that we can give that time to another patient in need. We care about you and the management of your healthcare and want to make sure that you follow up as recommended. To provide quality care and timely appointments to all our patients, you may be dismissed from the practice if you do not show for three (3) scheduled appointments within a 12-month period. We would like to continue treating your healthcare needs. Please call the office to reschedule your appointment, if needed.      Sincerely,        Sonny Maynard PA-C

## 2022-04-25 ENCOUNTER — HOSPITAL ENCOUNTER (OUTPATIENT)
Dept: PREADMISSION TESTING | Age: 32
Discharge: HOME | End: 2022-04-29

## 2022-04-25 VITALS — WEIGHT: 115 LBS | HEIGHT: 65 IN | BODY MASS INDEX: 19.16 KG/M2

## 2022-04-25 NOTE — PROGRESS NOTES
Pre-op Instructions For Out-Patient Surgery    Medication Instructions:  · Please stop herbs and any supplements now (includes vitamins and minerals). · Please contact your surgeon and prescribing physician for pre-op instructions for any blood thinners. · If you have inhalers/aerosol treatments at home, please use them the morning of your surgery and bring the inhalers with you to the hospital.    · Please take the following medications the morning of your surgery with a sip of water:    none    Surgery Instructions:  1. After midnight before surgery:  Do not eat or drink anything, including water, mints, gum, and hard candy. You may brush your teeth without swallowing. No smoking, chewing tobacco, or street drugs. 2. Please shower or bathe before surgery. If you were given Surgical Scrub Chlorhexidine Gluconate Liquid (CHG), please shower the night before and the morning of your surgery following the detailed instructions you received during your pre-admission visit. 3. Please do not wear any cologne, lotion, powder, deodorant, jewelry, piercings, perfume, makeup, nail polish, hair accessories, or hair spray on the day of surgery. Wear loose comfortable clothing. 4. Leave your valuables at home. Bring a storage case for any glasses/contacts. 5. An adult who is responsible for you MUST drive you home and should be with you for the first 24 hours after surgery. The Day of Surgery:  · Arrive at [facility] Surgery Entrance at the time directed by your surgeon and check in at the desk. · If you have a living will or healthcare power of , please bring a copy. · You will be taken to the pre-op holding area where you will be prepared for surgery. A physical assessment will be performed by a nurse practitioner or house officer.   Your IV will be started and you will meet your anesthesiologist.    · When you go to surgery, your family will be directed to the surgical waiting room, where the doctor should speak with them after your surgery. · After surgery, you will be taken to the recovery room then when you are awake and stable you will go to the short stay unit for preparation to be discharged. · If you use a Bi-PAP or C-PAP machine, please bring it with you and leave it in the car in case it is needed in recovery room. Recent labs, EKG, ECHO in chart. Pt verbalizes understanding of instructions. RN spoke with Dr Hien Del Angel regarding pt heart hx, no orders received.

## 2022-04-28 ENCOUNTER — ANESTHESIA EVENT (OUTPATIENT)
Dept: OPERATING ROOM | Age: 32
End: 2022-04-28
Payer: MEDICARE

## 2022-04-29 ENCOUNTER — ANESTHESIA (OUTPATIENT)
Dept: OPERATING ROOM | Age: 32
End: 2022-04-29
Payer: MEDICARE

## 2022-04-29 ENCOUNTER — HOSPITAL ENCOUNTER (OUTPATIENT)
Age: 32
Setting detail: OUTPATIENT SURGERY
Discharge: HOME | End: 2022-04-29
Attending: SURGERY | Admitting: SURGERY
Payer: MEDICARE

## 2022-04-29 VITALS — OXYGEN SATURATION: 100 % | SYSTOLIC BLOOD PRESSURE: 94 MMHG | TEMPERATURE: 96.6 F | DIASTOLIC BLOOD PRESSURE: 54 MMHG

## 2022-04-29 VITALS
WEIGHT: 115 LBS | SYSTOLIC BLOOD PRESSURE: 115 MMHG | OXYGEN SATURATION: 96 % | TEMPERATURE: 98.1 F | DIASTOLIC BLOOD PRESSURE: 65 MMHG | HEIGHT: 65 IN | HEART RATE: 68 BPM | BODY MASS INDEX: 19.16 KG/M2 | RESPIRATION RATE: 16 BRPM

## 2022-04-29 PROCEDURE — 7100000000 HC PACU RECOVERY - FIRST 15 MIN: Performed by: SURGERY

## 2022-04-29 PROCEDURE — 2709999900 HC NON-CHARGEABLE SUPPLY: Performed by: SURGERY

## 2022-04-29 PROCEDURE — 2500000003 HC RX 250 WO HCPCS: Performed by: NURSE ANESTHETIST, CERTIFIED REGISTERED

## 2022-04-29 PROCEDURE — 6370000000 HC RX 637 (ALT 250 FOR IP): Performed by: ANESTHESIOLOGY

## 2022-04-29 PROCEDURE — 6360000002 HC RX W HCPCS: Performed by: NURSE ANESTHETIST, CERTIFIED REGISTERED

## 2022-04-29 PROCEDURE — 7100000031 HC ASPR PHASE II RECOVERY - ADDTL 15 MIN: Performed by: SURGERY

## 2022-04-29 PROCEDURE — 7100000001 HC PACU RECOVERY - ADDTL 15 MIN: Performed by: SURGERY

## 2022-04-29 PROCEDURE — 2580000003 HC RX 258: Performed by: ANESTHESIOLOGY

## 2022-04-29 PROCEDURE — 3700000000 HC ANESTHESIA ATTENDED CARE: Performed by: SURGERY

## 2022-04-29 PROCEDURE — 7100000011 HC PHASE II RECOVERY - ADDTL 15 MIN: Performed by: SURGERY

## 2022-04-29 PROCEDURE — 3609027000 HC COLONOSCOPY: Performed by: SURGERY

## 2022-04-29 PROCEDURE — 7100000030 HC ASPR PHASE II RECOVERY - FIRST 15 MIN: Performed by: SURGERY

## 2022-04-29 PROCEDURE — 3700000001 HC ADD 15 MINUTES (ANESTHESIA): Performed by: SURGERY

## 2022-04-29 PROCEDURE — 7100000010 HC PHASE II RECOVERY - FIRST 15 MIN: Performed by: SURGERY

## 2022-04-29 PROCEDURE — 6360000002 HC RX W HCPCS: Performed by: SURGERY

## 2022-04-29 RX ORDER — PROPOFOL 10 MG/ML
INJECTION, EMULSION INTRAVENOUS CONTINUOUS PRN
Status: DISCONTINUED | OUTPATIENT
Start: 2022-04-29 | End: 2022-04-29 | Stop reason: SDUPTHER

## 2022-04-29 RX ORDER — MIDAZOLAM HYDROCHLORIDE 1 MG/ML
INJECTION INTRAMUSCULAR; INTRAVENOUS PRN
Status: DISCONTINUED | OUTPATIENT
Start: 2022-04-29 | End: 2022-04-29 | Stop reason: SDUPTHER

## 2022-04-29 RX ORDER — DEXAMETHASONE SODIUM PHOSPHATE 4 MG/ML
INJECTION, SOLUTION INTRA-ARTICULAR; INTRALESIONAL; INTRAMUSCULAR; INTRAVENOUS; SOFT TISSUE PRN
Status: DISCONTINUED | OUTPATIENT
Start: 2022-04-29 | End: 2022-04-29 | Stop reason: SDUPTHER

## 2022-04-29 RX ORDER — FENTANYL CITRATE 50 UG/ML
INJECTION, SOLUTION INTRAMUSCULAR; INTRAVENOUS PRN
Status: DISCONTINUED | OUTPATIENT
Start: 2022-04-29 | End: 2022-04-29 | Stop reason: SDUPTHER

## 2022-04-29 RX ORDER — ONDANSETRON 2 MG/ML
INJECTION INTRAMUSCULAR; INTRAVENOUS PRN
Status: DISCONTINUED | OUTPATIENT
Start: 2022-04-29 | End: 2022-04-29 | Stop reason: SDUPTHER

## 2022-04-29 RX ORDER — SODIUM CHLORIDE, SODIUM LACTATE, POTASSIUM CHLORIDE, CALCIUM CHLORIDE 600; 310; 30; 20 MG/100ML; MG/100ML; MG/100ML; MG/100ML
INJECTION, SOLUTION INTRAVENOUS CONTINUOUS
Status: DISCONTINUED | OUTPATIENT
Start: 2022-04-29 | End: 2022-04-29 | Stop reason: HOSPADM

## 2022-04-29 RX ORDER — PROPOFOL 10 MG/ML
INJECTION, EMULSION INTRAVENOUS PRN
Status: DISCONTINUED | OUTPATIENT
Start: 2022-04-29 | End: 2022-04-29 | Stop reason: SDUPTHER

## 2022-04-29 RX ORDER — SODIUM CHLORIDE 9 MG/ML
INJECTION, SOLUTION INTRAVENOUS PRN
Status: DISCONTINUED | OUTPATIENT
Start: 2022-04-29 | End: 2022-04-29 | Stop reason: HOSPADM

## 2022-04-29 RX ORDER — SODIUM CHLORIDE 0.9 % (FLUSH) 0.9 %
5-40 SYRINGE (ML) INJECTION PRN
Status: DISCONTINUED | OUTPATIENT
Start: 2022-04-29 | End: 2022-04-29 | Stop reason: HOSPADM

## 2022-04-29 RX ORDER — LIDOCAINE HYDROCHLORIDE 10 MG/ML
1 INJECTION, SOLUTION EPIDURAL; INFILTRATION; INTRACAUDAL; PERINEURAL
Status: DISCONTINUED | OUTPATIENT
Start: 2022-04-29 | End: 2022-04-29 | Stop reason: HOSPADM

## 2022-04-29 RX ORDER — SCOLOPAMINE TRANSDERMAL SYSTEM 1 MG/1
1 PATCH, EXTENDED RELEASE TRANSDERMAL ONCE
Status: DISCONTINUED | OUTPATIENT
Start: 2022-04-29 | End: 2022-04-29 | Stop reason: HOSPADM

## 2022-04-29 RX ORDER — SODIUM CHLORIDE 0.9 % (FLUSH) 0.9 %
5-40 SYRINGE (ML) INJECTION EVERY 12 HOURS SCHEDULED
Status: DISCONTINUED | OUTPATIENT
Start: 2022-04-29 | End: 2022-04-29 | Stop reason: HOSPADM

## 2022-04-29 RX ORDER — LIDOCAINE HYDROCHLORIDE 10 MG/ML
INJECTION, SOLUTION EPIDURAL; INFILTRATION; INTRACAUDAL; PERINEURAL PRN
Status: DISCONTINUED | OUTPATIENT
Start: 2022-04-29 | End: 2022-04-29 | Stop reason: SDUPTHER

## 2022-04-29 RX ADMIN — FENTANYL CITRATE 25 MCG: 50 INJECTION, SOLUTION INTRAMUSCULAR; INTRAVENOUS at 14:35

## 2022-04-29 RX ADMIN — ONDANSETRON 4 MG: 2 INJECTION INTRAMUSCULAR; INTRAVENOUS at 14:19

## 2022-04-29 RX ADMIN — PROPOFOL 20 MG: 10 INJECTION, EMULSION INTRAVENOUS at 14:35

## 2022-04-29 RX ADMIN — PROPOFOL 100 MCG/KG/MIN: 10 INJECTION, EMULSION INTRAVENOUS at 14:10

## 2022-04-29 RX ADMIN — MIDAZOLAM 1 MG: 1 INJECTION INTRAMUSCULAR; INTRAVENOUS at 14:01

## 2022-04-29 RX ADMIN — CEFAZOLIN SODIUM 2000 MG: 10 INJECTION, POWDER, FOR SOLUTION INTRAVENOUS at 14:10

## 2022-04-29 RX ADMIN — PROPOFOL 160 MG: 10 INJECTION, EMULSION INTRAVENOUS at 14:06

## 2022-04-29 RX ADMIN — MIDAZOLAM 1 MG: 1 INJECTION INTRAMUSCULAR; INTRAVENOUS at 13:57

## 2022-04-29 RX ADMIN — FENTANYL CITRATE 25 MCG: 50 INJECTION, SOLUTION INTRAMUSCULAR; INTRAVENOUS at 14:04

## 2022-04-29 RX ADMIN — DEXAMETHASONE SODIUM PHOSPHATE 4 MG: 4 INJECTION, SOLUTION INTRAMUSCULAR; INTRAVENOUS at 14:17

## 2022-04-29 RX ADMIN — SODIUM CHLORIDE, POTASSIUM CHLORIDE, SODIUM LACTATE AND CALCIUM CHLORIDE: 600; 310; 30; 20 INJECTION, SOLUTION INTRAVENOUS at 12:10

## 2022-04-29 RX ADMIN — LIDOCAINE HYDROCHLORIDE 40 MG: 10 INJECTION, SOLUTION EPIDURAL; INFILTRATION; INTRACAUDAL; PERINEURAL at 14:06

## 2022-04-29 ASSESSMENT — PULMONARY FUNCTION TESTS
PIF_VALUE: 13
PIF_VALUE: 1
PIF_VALUE: 13
PIF_VALUE: 13
PIF_VALUE: 15
PIF_VALUE: 1
PIF_VALUE: 13
PIF_VALUE: 1
PIF_VALUE: 1
PIF_VALUE: 13
PIF_VALUE: 1
PIF_VALUE: 0
PIF_VALUE: 2
PIF_VALUE: 2
PIF_VALUE: 13
PIF_VALUE: 0
PIF_VALUE: 0
PIF_VALUE: 13
PIF_VALUE: 0
PIF_VALUE: 1
PIF_VALUE: 13
PIF_VALUE: 1
PIF_VALUE: 13
PIF_VALUE: 2
PIF_VALUE: 13
PIF_VALUE: 18
PIF_VALUE: 13
PIF_VALUE: 15
PIF_VALUE: 0
PIF_VALUE: 13
PIF_VALUE: 13
PIF_VALUE: 1
PIF_VALUE: 13
PIF_VALUE: 13
PIF_VALUE: 15
PIF_VALUE: 13

## 2022-04-29 ASSESSMENT — ENCOUNTER SYMPTOMS
BACK PAIN: 0
APNEA: 0
SINUS PRESSURE: 0
CHEST TIGHTNESS: 0
STRIDOR: 0
COUGH: 0
SHORTNESS OF BREATH: 0
SORE THROAT: 0
SHORTNESS OF BREATH: 0
SINUS PAIN: 0
WHEEZING: 0
RHINORRHEA: 0
TROUBLE SWALLOWING: 0

## 2022-04-29 ASSESSMENT — PAIN - FUNCTIONAL ASSESSMENT: PAIN_FUNCTIONAL_ASSESSMENT: 0-10

## 2022-04-29 ASSESSMENT — LIFESTYLE VARIABLES: SMOKING_STATUS: 0

## 2022-04-29 NOTE — OP NOTE
Operative Note      Patient: Gauri Douglas  YOB: 1990  MRN: [id number]    Date of Procedure: 4/29/2022    PROCEDURE NOTE    DATE OF PROCEDURE: 4/29/2022    SURGEON: Min Barragan MD    ASSISTANT: None    PREOPERATIVE DIAGNOSIS: Rectal bleeding. History of thrombosed external hemorrhoid. POSTOPERATIVE DIAGNOSIS: Grossly normal colonoscopy and ileoscopy. Resolved thrombosed external hemorrhoid. OPERATION: Total colonoscopy to cecum with intubation of terminal ileum    ANESTHESIA: General    ESTIMATED BLOOD LOSS: None    COMPLICATIONS: None     SPECIMENS:  Was Not Obtained    HISTORY: The patient is a 32y.o. year old female with history of above preop diagnosis. I recommended colonoscopy with possible biopsy or polypectomy and I explained the risk, benefits, expected outcome, and alternatives to the procedure. Risks included but are not limited to bleeding, infection, respiratory distress, hypotension, and perforation of the colon and possibility of missing a lesion. The patient understands and is in agreement. PROCEDURE: The patient was given IV conscious sedation. The patient's SPO2 remained above 90% throughout the procedure. Digital rectal exam was normal.  The colonoscope was inserted through the anus into the rectum and advanced under direct vision to the cecum without difficulty. Terminal ileum was examined for approximately 2 inches. The prep was good. Findings:  Terminal ileum: normal    Cecum/Ascending colon: normal    Transverse colon: normal    Descending/Sigmoid colon: normal    Rectum/Anus: examined in normal and retroflexed positions and was normal.  Resolved thrombosed external hemorrhoid. Withdrawal Time was (minutes): 20      The colon was decompressed. While withdrawing the scope the above findings were verified and the scope was removed. The patient tolerated the procedure and conscious sedation without unusual events.     In the recovery room patient was examined and remains hemodynamically stable. Discharge home when criteria met. Recommendations/Plan:   1. Discussed with the family  2. High fiber diet   3.  Precautions to avoid constipation    Electronically signed by Madi Leslie MD  on 4/29/2022 at 2:45 PM

## 2022-04-29 NOTE — H&P
HISTORY and [address]       NAME:  Mai Rosa  MRN: [id number]   YOB: 1990   Date: 4/29/2022   Age: 32 y.o. Gender: female       COMPLAINT AND PRESENT HISTORY:   Mai Rosa  is a 32 y.o. female presenting today for HEMORRHOIDECTOMY EUA as r/t EXTERNAL HEMORRHOIDS. Pt states she has had hemorrhoids for several years but got worse over the last year. States she was recently admitted to the hospital because of it. Pt states she does have some pain 6/10 in severity. She states she has bleeding frequently, a few weeks ago she had blood leaking from her rectum and through her clothing. She states she had intermittent diarrhea, as well as rectal pressure. This is pt's second colonoscopy, states it was poor prep. Pt does report a family hx of rectal cancer. Pt reports completing bowel prep without complications, last BM reported this morning. She states last BM was clear with no stool particles. Pt has a PMHX significant for Bradycardia        NPO since midnight. Pt does not wear dentures. Pt denies any hx of MRSA infection  Pt not currently taking any blood thinners or anticoagulants  Pt PONV, prolonged emergence from general anesthesia. Pt denies any acute symptoms of illness at this time including no SOB, CP, fever, URI or UTI symptoms. RECENT IMAGING    No results found.      PAST MEDICAL HISTORY     Past Medical History:   Diagnosis Date    Bradycardia     Cancer ([address]     Cervical cancer (HCC)     Chronic back pain     LGSIL of cervix of undetermined significance 04/04/2017    HPV+    Menorrhagia     PONV (postoperative nausea and vomiting)     Prolonged emergence from general anesthesia     Uterine perforation 08/2017    history of during laparoscopy    Vasodepressor syncope     cardiac eval 8/2017 (inpatient at [address], notes [address])       SURGICAL HISTORY       Past Surgical History:   Procedure Laterality Date    COLPOSCOPY 2017    LORA I    HYSTERECTOMY  2018    vaginal    ND VAGINAL HYSTERECTOMY,UTERUS 250 GMS/< N/A 2018    HYSTERECTOMY VAGINAL AND REVISION OF HYMEN TEAR performed by Pamella Hilliard MD at [address] [place] [address] Drive SALPINGECTOMY Bilateral 2017    partial with D+C, hysteroscopy, failed NovaSure ablation    TUBAL LIGATION      WISDOM TOOTH EXTRACTION         FAMILY HISTORY       Family History   Problem Relation Age of Onset    Other Mother         problems with irreg cycles    Heart Disease Father     Other Father         heart attack    Diabetes Maternal Grandmother        SOCIAL HISTORY       Social History     Socioeconomic History    Marital status: Single     Spouse name: Not on file    Number of children: Not on file    Years of education: Not on file    Highest education level: Not on file   Occupational History    Not on file   Tobacco Use    Smoking status: Former Smoker     Packs/day: 1.00     Types: Cigarettes     Quit date: 10/11/2021     Years since quittin.5    Smokeless tobacco: Never Used   Vaping Use    Vaping Use: Never used   Substance and Sexual Activity    Alcohol use: No    Drug use: No    Sexual activity: Yes     Partners: Male     Birth control/protection: Inserts, Injection   Other Topics Concern    Not on file   Social History Narrative    Not on file     Social Determinants of Health     Financial Resource Strain:     Difficulty of Paying Living Expenses: Not on file   Food Insecurity:     Worried About Running Out of Food in the Last Year: Not on file    Manfred of Food in the Last Year: Not on file   Transportation Needs:     Lack of Transportation (Medical): Not on file    Lack of Transportation (Non-Medical):  Not on file   Physical Activity:     Days of Exercise per Week: Not on file    Minutes of Exercise per Session: Not on file   Stress:     Feeling of Stress : Not on file   Social Connections:     Frequency of Communication with Friends and Family: Not on file    Frequency of Social Gatherings with Friends and Family: Not on file    Attends Alevism Services: Not on file    Active Member of Clubs or Organizations: Not on file    Attends Club or Organization Meetings: Not on file    Marital Status: Not on file   Intimate Partner Violence:     Fear of Current or Ex-Partner: Not on file    Emotionally Abused: Not on file    Physically Abused: Not on file    Sexually Abused: Not on file   Housing Stability:     Unable to Pay for Housing in the Last Year: Not on file    Number of [place] in the Last Year: Not on file    Unstable Housing in the Last Year: Not on file           REVIEW OF SYSTEMS      Allergies   Allergen Reactions    Morphine Other (See Comments)     phlebitis       No current facility-administered medications on file prior to encounter. Current Outpatient Medications on File Prior to Encounter   Medication Sig Dispense Refill    ondansetron (ZOFRAN-ODT) 4 MG disintegrating tablet Take 1 tablet by mouth every 8 hours as needed for Nausea or Vomiting 21 tablet 0        Review of Systems   Constitutional: Negative for chills, diaphoresis, fatigue and fever. HENT: Negative for congestion, dental problem, ear pain, postnasal drip, rhinorrhea, sinus pressure, sinus pain, sore throat and trouble swallowing. Respiratory: Negative for apnea, cough, chest tightness, shortness of breath and wheezing. Cardiovascular: Negative for chest pain, palpitations and leg swelling. Gastrointestinal:        SEE HPI    Genitourinary: Negative for dysuria, flank pain, frequency and hematuria. Musculoskeletal: Negative for back pain, joint swelling and myalgias. Skin: Negative for rash and wound. Neurological: Positive for numbness. Negative for dizziness, weakness and headaches. Bilateral hands/arms   Hematological: Bruises/bleeds easily. Psychiatric/Behavioral: Negative for agitation and confusion.  The patient is not nervous/anxious. See HPI    GENERAL PHYSICAL EXAM:     Vitals: See nurse flowsheet for current vitals. Physical Exam  Constitutional:       General: She is not in acute distress. Appearance: Normal appearance. She is well-developed and normal weight. She is not ill-appearing or toxic-appearing. HENT:      Head: Normocephalic and atraumatic. Mouth/Throat:      Mouth: Mucous membranes are dry. Pharynx: Oropharynx is clear. No oropharyngeal exudate or posterior oropharyngeal erythema. Eyes:      Extraocular Movements: Extraocular movements intact. Conjunctiva/sclera: Conjunctivae normal.      Pupils: Pupils are equal, round, and reactive to light. Cardiovascular:      Rate and Rhythm: Regular rhythm. Bradycardia present. Pulses: Normal pulses. Heart sounds: Normal heart sounds. No murmur heard. No friction rub. No gallop. Pulmonary:      Effort: Pulmonary effort is normal.      Breath sounds: Normal breath sounds. No wheezing. Abdominal:      General: Bowel sounds are normal. There is no distension. Palpations: Abdomen is soft. Tenderness: There is no abdominal tenderness. There is no guarding or rebound. Comments: Hyperactive bs. Musculoskeletal:         General: No swelling. Normal range of motion. Cervical back: Normal range of motion and neck supple. No rigidity or tenderness. Right lower leg: No edema. Left lower leg: No edema. Skin:     General: Skin is warm and dry. Findings: No erythema. Neurological:      General: No focal deficit present. Mental Status: She is alert and oriented to person, place, and time. Mental status is at baseline. Sensory: No sensory deficit. Psychiatric:         Mood and Affect: Mood normal.         Behavior: Behavior normal.         Thought Content:  Thought content normal.         Judgment: Judgment normal. PROVISIONAL DIAGNOSES / SURGERY:      HEMORRHOIDECTOMY EUA     EXTERNAL HEMORRHOIDS    Patient Active Problem List    Diagnosis Date Noted    S/P TVH with hymenal remnant repair 1/31/18 01/31/2018    H/O total vaginal hysterectomy 01/31/2018    History of bilateral tubal ligation 01/30/2018    CIN1 on colposcopy (5/18/17) 01/30/2018    Menorrhagia 01/30/2018    Bradycardia 01/30/2018    Chronic back pain 01/30/2018    H/O LSIL HRHPV+ pap 4/4/17 01/30/2018    H/O endometriosis 01/30/2018    Pelvic pain in female 01/30/2018    Menometrorrhagia 01/30/2018    Uterine fibroid 01/30/2018               KEERTHI Roque - CNP on 4/29/2022 at 11:18 AM

## 2022-04-29 NOTE — ANESTHESIA POSTPROCEDURE EVALUATION
POST- ANESTHESIA EVALUATION       Pt Name: Corey Ardon  MRN: [id number]  [place]: 1990  Date of evaluation: 4/29/2022  Time:  5:00 PM      /65   Pulse 68   Temp 98.1 °F (36.7 °C) (Temporal)   Resp 16   Ht 5' 5\" (1.651 m)   Wt 115 lb (52.2 kg)   LMP 01/23/2018 (Exact Date)   SpO2 96%   BMI 19.14 kg/m²      Consciousness Level  Awake  Cardiopulmonary Status  Stable  Pain Adequately Treated YES  Nausea / Vomiting  NO  Adequate Hydration  YES  Anesthesia Related Complications NONE      Electronically signed by Erika Sawyer MD on 4/29/2022 at 5:00 PM       Department of Anesthesiology  Postprocedure Note    Patient: Corey Ardon  MRN: [id number]  YOB: 1990  Date of evaluation: 4/29/2022  Time:  5:00 PM     Procedure Summary     Date: 04/29/22 Room / Location: [address] [place] [address] Roger [address] / [facility]    Anesthesia Start: 8285 Anesthesia Stop: 7059    Procedure: COLONOSCOPY DIAGNOSTIC (N/A ) Diagnosis: (EXTERNAL HEMORRHOIDS)    Surgeons: Cindy Selby MD Responsible Provider: Erika Sawyer MD    Anesthesia Type: general ASA Status: 2          Anesthesia Type: No value filed. Meghan Phase I: Meghan Score: 10    Meghan Phase II: Meghan Score: 10    Last vitals: Reviewed and per EMR flowsheets.        Anesthesia Post Evaluation

## 2022-04-29 NOTE — ANESTHESIA PRE PROCEDURE
Department of Anesthesiology  Preprocedure Note       Name:  Vicente Andino   Age:  32 y.o.  :  1990                                          MRN:  [id number]         Date:  2022      Surgeon: Karla Santiago):  MD Bao Hairston MD    Procedure: Procedure(s): HEMORRHOIDECTOMY EUA  COLONOSCOPY DIAGNOSTIC    Medications prior to admission:   Prior to Admission medications    Medication Sig Start Date End Date Taking? Authorizing Provider   ondansetron (ZOFRAN-ODT) 4 MG disintegrating tablet Take 1 tablet by mouth every 8 hours as needed for Nausea or Vomiting 21   Elodia Smith PA-C       Current medications:    Current Facility-Administered Medications   Medication Dose Route Frequency Provider Last Rate Last Admin    ceFAZolin (ANCEF) 2000 mg in dextrose 5 % 50 mL IVPB  2,000 mg IntraVENous Once Bao Kaufman MD        lidocaine PF 1 % injection 1 mL  1 mL IntraDERmal Once PRN Hemanth Garcia MD        lactated ringers infusion   IntraVENous Continuous [place] MD David 125 mL/hr at 22 1210 New Bag at 22 1210    sodium chloride flush 0.9 % injection 5-40 mL  5-40 mL IntraVENous 2 times per day Hemanth Garcia MD        sodium chloride flush 0.9 % injection 5-40 mL  5-40 mL IntraVENous PRN [place] Hernandez MD        0.9 % sodium chloride infusion   IntraVENous PRN Hemanth Garcia MD           Allergies:     Allergies   Allergen Reactions    Morphine Other (See Comments)     phlebitis       Problem List:    Patient Active Problem List   Diagnosis Code    History of bilateral tubal ligation Z98.51    CIN1 on colposcopy (17) Z87.410    Menorrhagia N92.0    Bradycardia R00.1    Chronic back pain M54.9, G89.29    H/O LSIL HRHPV+ pap 17 Z87.42    H/O endometriosis Z87.42    Pelvic pain in female R10.2    Menometrorrhagia N92.1    Uterine fibroid D25.9    S/P TVH with hymenal remnant repair 18 Z90.710    H/O total vaginal hysterectomy Z90.710 Past Medical History:        Diagnosis Date    Bradycardia     [facility] [address] [address]     Cervical cancer (HCC)     Chronic back pain     LGSIL of cervix of undetermined significance 2017    HPV+    Menorrhagia     PONV (postoperative nausea and vomiting)     Prolonged emergence from general anesthesia     Uterine perforation 2017    history of during laparoscopy    Vasodepressor syncope     cardiac eval 2017 (inpatient at [place], notes in [address])       Past Surgical History:        Procedure Laterality Date    COLPOSCOPY  2017    LORA I    HYSTERECTOMY  2018    vaginal    FL VAGINAL HYSTERECTOMY,UTERUS 250 GMS/< N/A 2018    HYSTERECTOMY VAGINAL AND REVISION OF HYMEN TEAR performed by Isis Frank MD at [address] SALPINGECTOMY Bilateral 2017    partial with D+C, hysteroscopy, failed NovaSure ablation    TUBAL LIGATION      WISDOM TOOTH EXTRACTION         Social History:    Social History     Tobacco Use    Smoking status: Former Smoker     Packs/day: 1.00     Types: Cigarettes     Quit date: 10/11/2021     Years since quittin.5    Smokeless tobacco: Never Used   Substance Use Topics    Alcohol use:  No                                Counseling given: Not Answered      Vital Signs (Current):   Vitals:    22 1149   BP: 102/61   Pulse: 56   Resp: 18   Temp: 97.1 °F (36.2 °C)   TempSrc: Infrared   SpO2: 95%   Weight: 115 lb (52.2 kg)   Height: 5' 5\" (1.651 m)                                              BP Readings from Last 3 Encounters:   22 102/61   21 102/70   08/10/21 110/72       NPO Status: Time of last liquid consumption:                         Time of last solid consumption: 1630                        Date of last liquid consumption: 22                        Date of last solid food consumption: 22    BMI:   Wt Readings from Last 3 Encounters:   22 115 lb (52.2 kg)   22 115 lb (52.2 kg)   21 110 lb 6.4 oz (50.1 kg)     Body mass index is 19.14 kg/m². CBC:   Lab Results   Component Value Date    WBC 7.3 03/08/2021    RBC 4.50 03/08/2021    HGB 13.9 03/08/2021    HCT 42.1 03/08/2021    MCV 93.6 03/08/2021    RDW 12.6 03/08/2021     03/08/2021       CMP:   Lab Results   Component Value Date     03/08/2021    K 4.1 03/08/2021     03/08/2021    CO2 24 03/08/2021    BUN 6 03/08/2021    CREATININE 0.53 03/08/2021    GFRAA >60 03/08/2021    LABGLOM >60 03/08/2021    GLUCOSE 86 03/08/2021    PROT 7.6 03/08/2021    CALCIUM 9.6 03/08/2021    BILITOT 0.27 03/08/2021    ALKPHOS 46 03/08/2021    AST 24 03/08/2021    ALT 17 03/08/2021       POC Tests: No results for input(s): POCGLU, POCNA, POCK, POCCL, POCBUN, POCHEMO, POCHCT in the last 72 hours. Coags:   Lab Results   Component Value Date    PROTIME 10.4 11/21/2013    INR 1.0 11/21/2013    APTT 27.0 11/21/2013       HCG (If Applicable):   Lab Results   Component Value Date    PREGTESTUR positive 11/09/2015    HCG NEGATIVE 11/21/2013    HCGQUANT <2 12/19/2011        ABGs: No results found for: PHART, PO2ART, WQS9AES, ZRF5MWU, BEART, R3KFPNNL     Type & Screen (If Applicable):  No results found for: LABABO, LABRH    Drug/Infectious Status (If Applicable):  Lab Results   Component Value Date    HEPCAB NONREACTIVE 03/08/2021       COVID-19 Screening (If Applicable): No results found for: COVID19        Anesthesia Evaluation  Patient summary reviewed and Nursing notes reviewed   history of anesthetic complications: PONV. Airway: Mallampati: I  TM distance: >3 FB   Neck ROM: full  Mouth opening: > = 3 FB Dental: normal exam         Pulmonary:normal exam  breath sounds clear to auscultation      (-) pneumonia, COPD, asthma, shortness of breath, recent URI, sleep apnea, rhonchi, wheezes, rales, stridor, not a current smoker and no decreased breath sounds          Patient did not smoke on day of surgery.                  Cardiovascular:Negative CV ROS  Exercise tolerance: good (>4 METS),       (-) pacemaker, hypertension, valvular problems/murmurs, past MI, CAD, CABG/stent, dysrhythmias,  angina,  CHF, orthopnea, PND,  VALENCIA, murmur, weak pulses,  friction rub, systolic click, carotid bruit,  JVD, peripheral edema, no pulmonary hypertension and no hyperlipidemia    ECG reviewed  Rhythm: regular  Rate: normal           Beta Blocker:  Not on Beta Blocker         Neuro/Psych:      (-) seizures, neuromuscular disease, TIA, CVA, headaches, psychiatric history and depression/anxiety            GI/Hepatic/Renal: Neg GI/Hepatic/Renal ROS       (-) hiatal hernia, GERD, PUD, hepatitis, liver disease, no renal disease, bowel prep and no morbid obesity       Endo/Other:    (+) no malignancy/cancer. (-) diabetes mellitus, hypothyroidism, hyperthyroidism, blood dyscrasia, arthritis, no electrolyte abnormalities, no malignancy/cancer               Abdominal:             Vascular: negative vascular ROS. - PVD, DVT and PE. Other Findings:           Anesthesia Plan      general     ASA 2       Induction: intravenous. MIPS: Postoperative opioids intended and Prophylactic antiemetics administered. Anesthetic plan and risks discussed with patient. Plan discussed with CRNA.                   Dwain Benavides MD   4/29/2022

## 2022-08-18 NOTE — ED NOTES
----- Message from Deloris Borden sent at 8/18/2022 11:15 AM CDT -----  Contact: pt'w wife  Type:  Same Day Appointment Request    Caller is requesting a same day appointment.  Caller declined first available appointment listed below.      Name of Caller:  pt's wife Velia#  When is the first available appointment?  9/1  Symptoms:  Cough and congestion and fever since last night   Best Call Back Number:  [phone] (home)     Additional Information:   Patient is asking to be seen today please call back to ana          Ronal Plummer MD at bedside updating pt on results and plan of care.          Pushpa Hazel RN  03/18/20 [id number]

## 2022-11-22 ENCOUNTER — APPOINTMENT (OUTPATIENT)
Dept: GENERAL RADIOLOGY | Age: 32
End: 2022-11-22
Payer: OTHER MISCELLANEOUS

## 2022-11-22 ENCOUNTER — APPOINTMENT (OUTPATIENT)
Dept: CT IMAGING | Age: 32
End: 2022-11-22
Payer: OTHER MISCELLANEOUS

## 2022-11-22 ENCOUNTER — HOSPITAL ENCOUNTER (EMERGENCY)
Age: 32
Discharge: HOME | End: 2022-11-22
Attending: EMERGENCY MEDICINE
Payer: OTHER MISCELLANEOUS

## 2022-11-22 VITALS
HEIGHT: 65 IN | SYSTOLIC BLOOD PRESSURE: 110 MMHG | HEART RATE: 91 BPM | DIASTOLIC BLOOD PRESSURE: 66 MMHG | OXYGEN SATURATION: 97 % | WEIGHT: 112 LBS | BODY MASS INDEX: 18.66 KG/M2 | RESPIRATION RATE: 20 BRPM | TEMPERATURE: 97.7 F

## 2022-11-22 DIAGNOSIS — S63.502A SPRAIN OF LEFT WRIST, INITIAL ENCOUNTER: ICD-10-CM

## 2022-11-22 DIAGNOSIS — S43.402A SPRAIN OF LEFT SHOULDER, UNSPECIFIED SHOULDER SPRAIN TYPE, INITIAL ENCOUNTER: ICD-10-CM
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
## 2022-11-22 DIAGNOSIS — S16.1XXA STRAIN OF NECK MUSCLE, INITIAL ENCOUNTER: ICD-10-CM

## 2022-11-22 PROCEDURE — 73110 X-RAY EXAM OF WRIST: CPT

## 2022-11-22 PROCEDURE — 73030 X-RAY EXAM OF SHOULDER: CPT

## 2022-11-22 PROCEDURE — 70450 CT HEAD/BRAIN W/O DYE: CPT

## 2022-11-22 PROCEDURE — 6370000000 HC RX 637 (ALT 250 FOR IP): Performed by: NURSE PRACTITIONER

## 2022-11-22 PROCEDURE — 72128 CT CHEST SPINE W/O DYE: CPT

## 2022-11-22 PROCEDURE — 72125 CT NECK SPINE W/O DYE: CPT

## 2022-11-22 PROCEDURE — 99284 EMERGENCY DEPT VISIT MOD MDM: CPT

## 2022-11-22 RX ORDER — TIZANIDINE 2 MG/1
2 TABLET ORAL ONCE
Status: COMPLETED | OUTPATIENT
Start: 2022-11-22 | End: 2022-11-22

## 2022-11-22 RX ORDER — CYCLOBENZAPRINE HCL 5 MG
5 TABLET ORAL 3 TIMES DAILY PRN
Qty: 15 TABLET | Refills: 0 | Status: SHIPPED | OUTPATIENT
Start: 2022-11-22 | End: 2022-11-27

## 2022-11-22 RX ORDER — ACETAMINOPHEN AND CODEINE PHOSPHATE 300; 30 MG/1; MG/1
1 TABLET ORAL EVERY 6 HOURS PRN
Qty: 6 TABLET | Refills: 0 | Status: SHIPPED | OUTPATIENT
Start: 2022-11-22 | End: 2022-11-24

## 2022-11-22 RX ORDER — ACETAMINOPHEN 500 MG
1000 TABLET ORAL ONCE
Status: COMPLETED | OUTPATIENT
Start: 2022-11-22 | End: 2022-11-22

## 2022-11-22 RX ADMIN — TIZANIDINE 2 MG: 2 TABLET ORAL at 11:40

## 2022-11-22 RX ADMIN — ACETAMINOPHEN 1000 MG: 500 TABLET ORAL at 09:42

## 2022-11-22 ASSESSMENT — ENCOUNTER SYMPTOMS
PHOTOPHOBIA: 0
BACK PAIN: 1
VOMITING: 0
COLOR CHANGE: 1
NAUSEA: 0
ABDOMINAL PAIN: 0
SHORTNESS OF BREATH: 0

## 2022-11-22 ASSESSMENT — PAIN SCALES - GENERAL
PAINLEVEL_OUTOF10: 10

## 2022-11-22 ASSESSMENT — PAIN - FUNCTIONAL ASSESSMENT: PAIN_FUNCTIONAL_ASSESSMENT: 0-10

## 2022-11-22 ASSESSMENT — VISUAL ACUITY: OU: 1
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
## 2022-11-22 NOTE — Clinical Note
Katalina Low was seen and treated in our emergency department on 11/22/2022. She may return to work on 11/28/2022. If you have any questions or concerns, please don't hesitate to call.       KEERTHI Yuen - CNP

## 2022-11-22 NOTE — DISCHARGE INSTRUCTIONS
Take medications as prescribed. Do not drive, operate machinery, or consume alcohol with taking Tylenol with codeine or Flexeril as it can cause drowsiness. Follow-up with your primary care provider. Return to emergency department symptoms worsen.
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
## 2022-11-24 ENCOUNTER — APPOINTMENT (OUTPATIENT)
Dept: CT IMAGING | Age: 32
End: 2022-11-24
Payer: OTHER MISCELLANEOUS

## 2022-11-24 ENCOUNTER — HOSPITAL ENCOUNTER (EMERGENCY)
Age: 32
Discharge: HOME | End: 2022-11-24
Attending: EMERGENCY MEDICINE
Payer: OTHER MISCELLANEOUS

## 2022-11-24 ENCOUNTER — APPOINTMENT (OUTPATIENT)
Dept: GENERAL RADIOLOGY | Age: 32
End: 2022-11-24
Payer: OTHER MISCELLANEOUS

## 2022-11-24 VITALS
RESPIRATION RATE: 20 BRPM | OXYGEN SATURATION: 97 % | DIASTOLIC BLOOD PRESSURE: 80 MMHG | SYSTOLIC BLOOD PRESSURE: 100 MMHG | TEMPERATURE: 98.2 F | HEART RATE: 47 BPM

## 2022-11-24 DIAGNOSIS — J01.00 ACUTE NON-RECURRENT MAXILLARY SINUSITIS: ICD-10-CM
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
## 2022-11-24 DIAGNOSIS — U07.1 COVID-19: Primary | ICD-10-CM

## 2022-11-24 LAB
AMORPHOUS: ABNORMAL
BILIRUBIN URINE: NEGATIVE
COLOR: YELLOW
EPITHELIAL CELLS UA: ABNORMAL /HPF (ref 0–5)
GLUCOSE URINE: NEGATIVE
HCG(URINE) PREGNANCY TEST: NEGATIVE
KETONES, URINE: NEGATIVE
LEUKOCYTE ESTERASE, URINE: NEGATIVE
MUCUS: ABNORMAL
NITRITE, URINE: NEGATIVE
PH UA: 6.5 (ref 5–8)
PROTEIN UA: NEGATIVE
RBC UA: ABNORMAL /HPF (ref 0–2)
SARS-COV-2, RAPID: DETECTED
SPECIFIC GRAVITY UA: 1.02 (ref 1–1.03)
SPECIMEN DESCRIPTION: ABNORMAL
TURBIDITY: ABNORMAL
URINE HGB: NEGATIVE
UROBILINOGEN, URINE: NORMAL
WBC UA: ABNORMAL /HPF (ref 0–5)

## 2022-11-24 PROCEDURE — 99285 EMERGENCY DEPT VISIT HI MDM: CPT

## 2022-11-24 PROCEDURE — 93005 ELECTROCARDIOGRAM TRACING: CPT | Performed by: EMERGENCY MEDICINE

## 2022-11-24 PROCEDURE — 6370000000 HC RX 637 (ALT 250 FOR IP): Performed by: EMERGENCY MEDICINE

## 2022-11-24 PROCEDURE — 71045 X-RAY EXAM CHEST 1 VIEW: CPT

## 2022-11-24 PROCEDURE — 72131 CT LUMBAR SPINE W/O DYE: CPT

## 2022-11-24 PROCEDURE — 6360000002 HC RX W HCPCS: Performed by: EMERGENCY MEDICINE

## 2022-11-24 PROCEDURE — 81025 URINE PREGNANCY TEST: CPT

## 2022-11-24 PROCEDURE — 96372 THER/PROPH/DIAG INJ SC/IM: CPT

## 2022-11-24 PROCEDURE — 81001 URINALYSIS AUTO W/SCOPE: CPT

## 2022-11-24 PROCEDURE — 87635 SARS-COV-2 COVID-19 AMP PRB: CPT

## 2022-11-24 RX ORDER — BENZONATATE 100 MG/1
200 CAPSULE ORAL ONCE
Status: COMPLETED | OUTPATIENT
Start: 2022-11-24 | End: 2022-11-24

## 2022-11-24 RX ORDER — BENZONATATE 100 MG/1
100 CAPSULE ORAL 3 TIMES DAILY PRN
Qty: 21 CAPSULE | Refills: 0 | Status: SHIPPED | OUTPATIENT
Start: 2022-11-24 | End: 2022-12-01

## 2022-11-24 RX ORDER — ONDANSETRON 4 MG/1
4 TABLET, ORALLY DISINTEGRATING ORAL EVERY 8 HOURS PRN
Qty: 10 TABLET | Refills: 0 | Status: SHIPPED | OUTPATIENT
Start: 2022-11-24

## 2022-11-24 RX ORDER — IBUPROFEN 800 MG/1
800 TABLET ORAL EVERY 8 HOURS PRN
Qty: 30 TABLET | Refills: 0 | Status: SHIPPED | OUTPATIENT
Start: 2022-11-24

## 2022-11-24 RX ORDER — KETOROLAC TROMETHAMINE 30 MG/ML
30 INJECTION, SOLUTION INTRAMUSCULAR; INTRAVENOUS ONCE
Status: COMPLETED | OUTPATIENT
Start: 2022-11-24 | End: 2022-11-24

## 2022-11-24 RX ORDER — AZITHROMYCIN 250 MG/1
250 TABLET, FILM COATED ORAL SEE ADMIN INSTRUCTIONS
Qty: 6 TABLET | Refills: 0 | Status: SHIPPED | OUTPATIENT
Start: 2022-11-24 | End: 2022-11-29

## 2022-11-24 RX ORDER — ONDANSETRON 4 MG/1
4 TABLET, ORALLY DISINTEGRATING ORAL ONCE
Status: COMPLETED | OUTPATIENT
Start: 2022-11-24 | End: 2022-11-24

## 2022-11-24 RX ORDER — ORPHENADRINE CITRATE 30 MG/ML
60 INJECTION INTRAMUSCULAR; INTRAVENOUS ONCE
Status: COMPLETED | OUTPATIENT
Start: 2022-11-24 | End: 2022-11-24

## 2022-11-24 RX ORDER — CETIRIZINE HYDROCHLORIDE 10 MG/1
10 TABLET ORAL DAILY
Qty: 30 TABLET | Refills: 0 | Status: SHIPPED | OUTPATIENT
Start: 2022-11-24 | End: 2022-12-24

## 2022-11-24 RX ORDER — FLUTICASONE PROPIONATE 50 MCG
1 SPRAY, SUSPENSION (ML) NASAL DAILY
Qty: 32 G | Refills: 0 | Status: SHIPPED | OUTPATIENT
Start: 2022-11-24

## 2022-11-24 RX ORDER — ACETAMINOPHEN 500 MG
1000 TABLET ORAL EVERY 8 HOURS PRN
Qty: 30 TABLET | Refills: 0 | Status: SHIPPED | OUTPATIENT
Start: 2022-11-24

## 2022-11-24 RX ADMIN — GUAIFENESIN, DEXTROMETHORPHAN HBR 1 TABLET: 600; 30 TABLET ORAL at 15:17

## 2022-11-24 RX ADMIN — ORPHENADRINE CITRATE 60 MG: 30 INJECTION INTRAMUSCULAR; INTRAVENOUS at 14:12

## 2022-11-24 RX ADMIN — KETOROLAC TROMETHAMINE 30 MG: 30 INJECTION, SOLUTION INTRAMUSCULAR; INTRAVENOUS at 14:12

## 2022-11-24 RX ADMIN — ONDANSETRON 4 MG: 4 TABLET, ORALLY DISINTEGRATING ORAL at 14:12

## 2022-11-24 RX ADMIN — BENZONATATE 200 MG: 100 CAPSULE ORAL at 14:23

## 2022-11-24 ASSESSMENT — ENCOUNTER SYMPTOMS
VOMITING: 0
NAUSEA: 1
ABDOMINAL PAIN: 0
SINUS PRESSURE: 1
BACK PAIN: 1
COUGH: 1
COLOR CHANGE: 0
SHORTNESS OF BREATH: 1
SINUS PAIN: 1
SORE THROAT: 0

## 2022-11-24 ASSESSMENT — PAIN SCALES - GENERAL: PAINLEVEL_OUTOF10: 10

## 2022-11-24 ASSESSMENT — PAIN DESCRIPTION - DESCRIPTORS: DESCRIPTORS: SHARP;SHOOTING;THROBBING

## 2022-11-24 ASSESSMENT — PAIN DESCRIPTION - LOCATION: LOCATION: CHEST

## 2022-11-24 ASSESSMENT — PAIN DESCRIPTION - FREQUENCY: FREQUENCY: CONTINUOUS

## 2022-11-24 ASSESSMENT — PAIN - FUNCTIONAL ASSESSMENT: PAIN_FUNCTIONAL_ASSESSMENT: 0-10

## 2022-11-24 NOTE — Clinical Note
Alissa Fabry was seen and treated in our emergency department on 11/24/2022. She may return to work on 11/30/2022. If you have any questions or concerns, please don't hesitate to call.       Mariella Joseph, DO
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
## 2022-11-26 LAB
EKG ATRIAL RATE: 57 BPM
EKG P AXIS: 54 DEGREES
EKG P-R INTERVAL: 142 MS
EKG Q-T INTERVAL: 408 MS
EKG QRS DURATION: 80 MS
EKG QTC CALCULATION (BAZETT): 397 MS
EKG R AXIS: 63 DEGREES
EKG T AXIS: 44 DEGREES
EKG VENTRICULAR RATE: 57 BPM

## 2022-12-06 ENCOUNTER — TELEPHONE (OUTPATIENT)
Dept: PRIMARY CARE CLINIC | Age: 32
End: 2022-12-06
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
## 2023-03-07 ENCOUNTER — OFFICE VISIT (OUTPATIENT)
Dept: PRIMARY CARE CLINIC | Age: 33
End: 2023-03-07
Payer: MEDICAID

## 2023-03-07 ENCOUNTER — HOSPITAL ENCOUNTER (OUTPATIENT)
Dept: GENERAL RADIOLOGY | Age: 33
Discharge: HOME | End: 2023-03-09
Payer: MEDICAID

## 2023-03-07 ENCOUNTER — HOSPITAL ENCOUNTER (OUTPATIENT)
Age: 33
Setting detail: SPECIMEN
Discharge: HOME | End: 2023-03-07

## 2023-03-07 ENCOUNTER — HOSPITAL ENCOUNTER (OUTPATIENT)
Age: 33
Discharge: HOME | End: 2023-03-09
Payer: MEDICAID

## 2023-03-07 VITALS
DIASTOLIC BLOOD PRESSURE: 64 MMHG | HEIGHT: 65 IN | OXYGEN SATURATION: 98 % | SYSTOLIC BLOOD PRESSURE: 112 MMHG | HEART RATE: 87 BPM | BODY MASS INDEX: 17.9 KG/M2 | RESPIRATION RATE: 16 BRPM | WEIGHT: 107.4 LBS

## 2023-03-07 DIAGNOSIS — M79.661 PAIN IN RIGHT LOWER LEG: ICD-10-CM

## 2023-03-07 DIAGNOSIS — M25.551 RIGHT HIP PAIN: Primary | ICD-10-CM

## 2023-03-07 DIAGNOSIS — Z13.220 ENCOUNTER FOR LIPID SCREENING FOR CARDIOVASCULAR DISEASE: ICD-10-CM

## 2023-03-07 DIAGNOSIS — M25.551 RIGHT HIP PAIN: ICD-10-CM

## 2023-03-07 DIAGNOSIS — Z13.6 ENCOUNTER FOR LIPID SCREENING FOR CARDIOVASCULAR DISEASE: ICD-10-CM

## 2023-03-07 DIAGNOSIS — M25.50 POLYARTHRALGIA: ICD-10-CM

## 2023-03-07 LAB
25(OH)D3 SERPL-MCNC: 23 NG/ML
ALBUMIN SERPL-MCNC: 4.6 G/DL (ref 3.5–5.2)
ALBUMIN/GLOBULIN RATIO: 1.4 (ref 1–2.5)
ALP SERPL-CCNC: 51 U/L (ref 35–104)
ALT SERPL-CCNC: 16 U/L (ref 5–33)
ANION GAP SERPL CALCULATED.3IONS-SCNC: 13 MMOL/L (ref 9–17)
AST SERPL-CCNC: 18 U/L
BILIRUB SERPL-MCNC: 0.3 MG/DL (ref 0.3–1.2)
BUN SERPL-MCNC: 8 MG/DL (ref 6–20)
CA-I BLD-SCNC: 1.28 MMOL/L (ref 1.13–1.33)
CALCIUM SERPL-MCNC: 9.3 MG/DL (ref 8.6–10.4)
CHLORIDE SERPL-SCNC: 105 MMOL/L (ref 98–107)
CHOLEST SERPL-MCNC: 175 MG/DL
CHOLESTEROL/HDL RATIO: 2.7
CO2 SERPL-SCNC: 21 MMOL/L (ref 20–31)
CREAT SERPL-MCNC: 0.51 MG/DL (ref 0.5–0.9)
GFR SERPL CREATININE-BSD FRML MDRD: >60 ML/MIN/1.73M2
GLUCOSE SERPL-MCNC: 80 MG/DL (ref 70–99)
HCT VFR BLD AUTO: 41 % (ref 36.3–47.1)
HDLC SERPL-MCNC: 65 MG/DL
HGB BLD-MCNC: 13.9 G/DL (ref 11.9–15.1)
LDLC SERPL CALC-MCNC: 99 MG/DL (ref 0–130)
MCH RBC QN AUTO: 31.2 PG (ref 25.2–33.5)
MCHC RBC AUTO-ENTMCNC: 33.9 G/DL (ref 28.4–34.8)
MCV RBC AUTO: 91.9 FL (ref 82.6–102.9)
NRBC AUTOMATED: 0 PER 100 WBC
PDW BLD-RTO: 12.7 % (ref 11.8–14.4)
PLATELET # BLD AUTO: 236 K/UL (ref 138–453)
PMV BLD AUTO: 11.7 FL (ref 8.1–13.5)
POTASSIUM SERPL-SCNC: 4.1 MMOL/L (ref 3.7–5.3)
PROT SERPL-MCNC: 7.9 G/DL (ref 6.4–8.3)
PTH-INTACT SERPL-MCNC: 34.4 PG/ML (ref 14–72)
RBC # BLD: 4.46 M/UL (ref 3.95–5.11)
SODIUM SERPL-SCNC: 139 MMOL/L (ref 135–144)
TRIGL SERPL-MCNC: 57 MG/DL
VIT B12 SERPL-MCNC: 336 PG/ML (ref 232–1245)
WBC # BLD AUTO: 7.5 K/UL (ref 3.5–11.3)

## 2023-03-07 PROCEDURE — 99214 OFFICE O/P EST MOD 30 MIN: CPT | Performed by: PHYSICIAN ASSISTANT

## 2023-03-07 PROCEDURE — 73590 X-RAY EXAM OF LOWER LEG: CPT

## 2023-03-07 PROCEDURE — 73502 X-RAY EXAM HIP UNI 2-3 VIEWS: CPT

## 2023-03-07 SDOH — ECONOMIC STABILITY: FOOD INSECURITY: WITHIN THE PAST 12 MONTHS, YOU WORRIED THAT YOUR FOOD WOULD RUN OUT BEFORE YOU GOT MONEY TO BUY MORE.: NEVER TRUE

## 2023-03-07 SDOH — ECONOMIC STABILITY: FOOD INSECURITY: WITHIN THE PAST 12 MONTHS, THE FOOD YOU BOUGHT JUST DIDN'T LAST AND YOU DIDN'T HAVE MONEY TO GET MORE.: NEVER TRUE

## 2023-03-07 SDOH — ECONOMIC STABILITY: HOUSING INSECURITY
IN THE LAST 12 MONTHS, WAS THERE A TIME WHEN YOU DID NOT HAVE A STEADY PLACE TO SLEEP OR SLEPT IN A SHELTER (INCLUDING NOW)?: NO

## 2023-03-07 SDOH — ECONOMIC STABILITY: INCOME INSECURITY: HOW HARD IS IT FOR YOU TO PAY FOR THE VERY BASICS LIKE FOOD, HOUSING, MEDICAL CARE, AND HEATING?: NOT HARD AT ALL

## 2023-03-07 ASSESSMENT — PATIENT HEALTH QUESTIONNAIRE - PHQ9
SUM OF ALL RESPONSES TO PHQ QUESTIONS 1-9: 0
SUM OF ALL RESPONSES TO PHQ QUESTIONS 1-9: 0
2. FEELING DOWN, DEPRESSED OR HOPELESS: 0
SUM OF ALL RESPONSES TO PHQ QUESTIONS 1-9: 0
SUM OF ALL RESPONSES TO PHQ9 QUESTIONS 1 & 2: 0
SUM OF ALL RESPONSES TO PHQ QUESTIONS 1-9: 0
1. LITTLE INTEREST OR PLEASURE IN DOING THINGS: 0

## 2023-03-07 ASSESSMENT — ENCOUNTER SYMPTOMS
VOMITING: 0
BACK PAIN: 0
SHORTNESS OF BREATH: 0
COUGH: 0
NAUSEA: 0
DIARRHEA: 0
RHINORRHEA: 0
CONSTIPATION: 0
SINUS PAIN: 0
ABDOMINAL PAIN: 0

## 2023-03-07 NOTE — PROGRESS NOTES
030 [facility] PRIMARY CARE  [address] [facility] [address]  [address] [address]  Dept: [phone]  Dept Fax: [phone]    Holly Moore is a 28 y.o. female who presents today for her medical conditions/complaints as noted below. Chief Complaint   Patient presents with    Pain      feels like it is in her bones, since last appointment; has used Meloxicam ibuprofen and Tylenol with no relief; now having on and off lower leg pain that is causing issues with walking, noticing redness and a lump now having severe hip pain and shoulder pain       HPI:     Patient presents to the office for evaluation of continued/recurrent right lower extremity pain. She describes continued pain near the outside of the right shin. She admits to intermittent swelling, redness, warmth to the area. No skin changes today. This pain can limit function and walking at times. She does admit to being on her feet throughout the day. Patient also has concerns for pain over the lateral hip on the right. This is intermittent as well. Pain is worse at nighttime when lying on the right. Patient also describes bilateral shoulder discomfort which is sporadic. She reports that pain is causing difficulty with sleep. Denies any numbness or tingling. No appetite change. No fevers or chills. No recent injury or trauma. She does not work physically demanding job. Blood pressure stable. Weight slightly decreased from last office visit.       No results found for: LABA1C          ( goal A1C is < 7)   No results found for: LABMICR  LDL Cholesterol (mg/dL)   Date Value   2021 108       (goal LDL is <100)   AST (U/L)   Date Value   2021 24     ALT (U/L)   Date Value   2021 17     BUN (mg/dL)   Date Value   2021 6     BP Readings from Last 3 Encounters:   23 112/64   22 100/80   22 110/66          (goal 120/80)    Past Medical History:   Diagnosis Date Bradycardia     Cancer (HCC)     Cervical cancer (HCC)     Chronic back pain     LGSIL of cervix of undetermined significance 2017    HPV+    Menorrhagia     PONV (postoperative nausea and vomiting)     Prolonged emergence from general anesthesia     Uterine perforation 2017    history of during laparoscopy    Vasodepressor syncope     cardiac eval 2017 (inpatient at [facility], notes in Epic)      Past Surgical History:   Procedure Laterality Date    COLONOSCOPY N/A 2022    COLONOSCOPY DIAGNOSTIC WITH RESOLVED EXTERNAL HEMORRHOID performed by Jm Jaime MD at [facility] OR    COLPOSCOPY  2017    LORA I    HYSTERECTOMY (CERVIX STATUS UNKNOWN)  2018    vaginal    SD VAGINAL HYSTERECTOMY UTERUS 250 GM/< N/A 2018    HYSTERECTOMY VAGINAL AND REVISION OF HYMEN TEAR performed by Russ Jacobson MD at [facility] OR    SALPINGECTOMY Bilateral 2017    partial with D+C, hysteroscopy, failed NovaSure ablation    TUBAL LIGATION      WISDOM TOOTH EXTRACTION         Family History   Problem Relation Age of Onset    Other Mother         problems with irreg cycles    Heart Disease Father     Other Father         heart attack    Diabetes Maternal Grandmother        Social History     Tobacco Use    Smoking status: Former     Packs/day: 1.00     Types: Cigarettes     Quit date: 10/11/2021     Years since quittin.4    Smokeless tobacco: Never   Substance Use Topics    Alcohol use: No      Current Outpatient Medications   Medication Sig Dispense Refill    ondansetron (ZOFRAN ODT) 4 MG disintegrating tablet Take 1 tablet by mouth every 8 hours as needed for Nausea or Vomiting 10 tablet 0    ibuprofen (IBU) 800 MG tablet Take 1 tablet by mouth every 8 hours as needed for Pain 30 tablet 0    acetaminophen (TYLENOL) 500 MG tablet Take 2 tablets by mouth every 8 hours as needed for Pain 30 tablet 0    fluticasone (FLONASE) 50 MCG/ACT nasal spray 1 spray by Each Nostril route daily 32 g 0     No  current facility-administered medications for this visit. Allergies   Allergen Reactions    Morphine Other (See Comments)     phlebitis       Health Maintenance   Topic Date Due    COVID-19 Vaccine (1) Never done    Varicella vaccine (1 of 2 - 2-dose childhood series) Never done    Flu vaccine (1) 03/07/2024 (Originally 8/1/2022)    Depression Screen  03/07/2024    Cervical cancer screen  08/10/2024    DTaP/Tdap/Td vaccine (3 - Td or Tdap) 06/29/2030    Hepatitis C screen  Completed    HIV screen  Completed    Hepatitis A vaccine  Aged Out    Hib vaccine  Aged Out    Meningococcal (ACWY) vaccine  Aged Out    Pneumococcal 0-64 years Vaccine  Aged Out       Subjective:      Review of Systems   Constitutional:  Negative for chills, fatigue and fever. HENT:  Negative for congestion, rhinorrhea and sinus pain. Respiratory:  Negative for cough and shortness of breath. Cardiovascular:  Negative for chest pain and leg swelling. Gastrointestinal:  Negative for abdominal pain, constipation, diarrhea, nausea and vomiting. Genitourinary:  Negative for difficulty urinating, frequency and urgency. Musculoskeletal:  Positive for arthralgias and myalgias. Negative for back pain. Neurological:  Negative for dizziness and headaches. Psychiatric/Behavioral:  Negative for confusion, dysphoric mood and sleep disturbance. The patient is not nervous/anxious. All other systems reviewed and are negative. Objective:     Physical Exam  Vitals and nursing note reviewed. Constitutional:       General: She is not in acute distress. Appearance: Normal appearance. HENT:      Head: Normocephalic. Mouth/Throat:      Mouth: Mucous membranes are moist.   Eyes:      Extraocular Movements: Extraocular movements intact. Conjunctiva/sclera: Conjunctivae normal.      Pupils: Pupils are equal, round, and reactive to light. Cardiovascular:      Rate and Rhythm: Normal rate and regular rhythm.       Pulses: Normal pulses. Heart sounds: Normal heart sounds. Pulmonary:      Effort: Pulmonary effort is normal. No respiratory distress. Breath sounds: Normal breath sounds. No wheezing. Abdominal:      General: Abdomen is flat. Bowel sounds are normal.      Palpations: Abdomen is soft. Tenderness: There is no abdominal tenderness. Musculoskeletal:      Cervical back: Normal range of motion. Right lower leg: No edema. Left lower leg: No edema. Comments: Right lower extremity: Full range of motion of hip. There is pain over the lateral hip with palpation and internal rotation. Pain with deep hip flexion. Right knee reveals full range of motion without any tenderness. Right leg reveals tenderness and mild swelling just above distal fibula, lateral to the tibia. No skin changes. Sensation intact. There is significant tenderness to light palpation. Ankle range of motion is full without any pain. Strength of lower extremities is equal bilaterally. Lymphadenopathy:      Cervical: No cervical adenopathy. Skin:     General: Skin is warm. Capillary Refill: Capillary refill takes less than 2 seconds. Neurological:      General: No focal deficit present. Mental Status: She is alert and oriented to person, place, and time. Psychiatric:         Mood and Affect: Mood normal.         Behavior: Behavior normal.     /64 (Site: Left Upper Arm, Position: Sitting, Cuff Size: Medium Adult)   Pulse 87   Resp 16   Ht 5' 5\" (1.651 m)   Wt 107 lb 6.4 oz (48.7 kg)   LMP 01/23/2018 (Exact Date)   SpO2 98%   BMI 17.87 kg/m²     Assessment:       ICD-10-CM    1. Right hip pain  M25.551 XR HIP 2-3 VW W PELVIS RIGHT      2. Pain in right lower leg  M79.661 XR TIBIA FIBULA RIGHT (2 VIEWS)     US DUP LOWER EXTREMITY RIGHT MARGARITA      3. Polyarthralgia  M25.50 CBC     Vitamin D 25 Hydroxy     PTH, Intact with Ionized Calcium     Vitamin B12      4.  Encounter for lipid screening for cardiovascular disease  Z13.220 Lipid Panel    Z13.6 Comprehensive Metabolic Panel, Fasting               Plan:      1. Patient with recurrent right hip pain. Plan for x-ray imaging to rule out bony abnormality. 2.  Patient with continued right lower leg pain near lateral shin. Plan for plain films x-ray tib-fib and ultrasound to evaluate for possible thrombophlebitis. 3.  Patient with polyarthralgia which is atypical for age range. Plan for blood work to evaluate vitamin D, PTH, calcium, CBC, vitamin B12. Previous autoimmune testing is reviewed and negative. 4.  Plan to assess lipid and CMP. Return for after labs, imaging. Orders Placed This Encounter   Procedures    XR HIP 2-3 VW W PELVIS RIGHT     Standing Status:   Future     Standing Expiration Date:   3/7/2024     Order Specific Question:   Reason for exam:     Answer:   right lateral hip pain    XR TIBIA FIBULA RIGHT (2 VIEWS)     Standing Status:   Future     Standing Expiration Date:   3/7/2024     Order Specific Question:   Reason for exam:     Answer:   right lower leg pain, chronic    US DUP LOWER EXTREMITY RIGHT MARGARITA     Standing Status:   Future     Standing Expiration Date:   3/7/2024    CBC     Standing Status:   Future     Number of Occurrences:   1     Standing Expiration Date:   3/7/2024    Vitamin D 25 Hydroxy     Standing Status:   Future     Number of Occurrences:   1     Standing Expiration Date:   3/7/2024    PTH, Intact with Ionized Calcium     Standing Status:   Future     Number of Occurrences:   1     Standing Expiration Date:   3/7/2024    Vitamin B12     Standing Status:   Future     Number of Occurrences:   1     Standing Expiration Date:   3/7/2024    Lipid Panel     Standing Status:   Future     Number of Occurrences:   1     Standing Expiration Date:   3/7/2024     Order Specific Question:   Is Patient Fasting?/# of Hours     Answer:    Fast 8-10 hours    Comprehensive Metabolic Panel, Fasting     Standing Status: Future     Number of Occurrences:   1     Standing Expiration Date:   3/7/2024         Patient given educational materials - see patient instructions. Discussed use, benefit, and side effects of prescribed medications. All patient questions answered. Pt voiced understanding. Reviewed health maintenance. Instructed to continue current medications, diet and exercise. Patient agreed with treatment plan. Follow up as directed.         Electronically signed by Marva Boggs PA-C on 3/7/2023 at 12:35 PM

## 2023-03-08 ENCOUNTER — TELEPHONE (OUTPATIENT)
Dept: PRIMARY CARE CLINIC | Age: 33
End: 2023-03-08

## 2023-03-08 ENCOUNTER — HOSPITAL ENCOUNTER (OUTPATIENT)
Dept: VASCULAR LAB | Age: 33
Discharge: HOME | End: 2023-03-08
Payer: MEDICAID

## 2023-03-08 DIAGNOSIS — M25.50 POLYARTHRALGIA: Primary | ICD-10-CM

## 2023-03-08 PROCEDURE — 93971 EXTREMITY STUDY: CPT

## 2023-03-08 NOTE — TELEPHONE ENCOUNTER
Patient calling into office, states that Rheumatology office did not receive referral. Needs to be faxed along with testing and last OV note.     Writer will fax to [phone]

## 2023-03-08 NOTE — TELEPHONE ENCOUNTER
The patient called back, she states that Dr. Bettencourt Player office is not able to make an appointment for her for a few months. The patient states that she spoke with [facility] and they will be able to get her in this month for evaluation. The referral, demographic sheet, labs, OV note need to be faxed to [phone].     Referral pended for approval.

## 2023-03-14 ENCOUNTER — HOSPITAL ENCOUNTER (OUTPATIENT)
Age: 33
Setting detail: SPECIMEN
Discharge: HOME | End: 2023-03-14

## 2023-03-14 ENCOUNTER — OFFICE VISIT (OUTPATIENT)
Dept: OBGYN CLINIC | Age: 33
End: 2023-03-14

## 2023-03-14 VITALS
DIASTOLIC BLOOD PRESSURE: 62 MMHG | WEIGHT: 109 LBS | SYSTOLIC BLOOD PRESSURE: 100 MMHG | HEIGHT: 65 IN | BODY MASS INDEX: 18.16 KG/M2

## 2023-03-14 DIAGNOSIS — Z01.419 ENCOUNTER FOR GYNECOLOGICAL EXAMINATION WITHOUT ABNORMAL FINDING: Primary | ICD-10-CM

## 2023-03-14 DIAGNOSIS — K64.9 BLEEDING HEMORRHOIDS: ICD-10-CM

## 2023-03-14 NOTE — PROGRESS NOTES
DATE OF VISIT:  8/10/21           History and Physical     Ezequiel Milian    :  1990  CHIEF COMPLAINT:           Chief Complaint   Patient presents with    Annual Exam       Here for annual Last pap 19neg Hyst hot flashes wants cultures  having pelvic pain/pain with intercourse                        HPI :   Ezequiel Milian is a 32 y.o. femaleGRAVIDA 4 PARA      Ezequiel Milian returns today for her annual exam.  She has been briefly lost to follow-up since her pos ED visit in 2019 for LLQ pain and a complex 6 cm left ovarian cyst on ultrasound. She presents today with multiple complaints. She states that she has been having painful intercourse since her TVH. The pain seems somewhat generalized but primarily begins in the lower left quadrant, is sharp and radiates across the entire pelvis. She is having regular bowel movements without constipation or diarrhea. She denies hematuria. She denies any UTI symptoms, hematuria or involuntary loss of urine. [facility] also complains of having sweats and occasional chills.  She is otherwise without any significant complaints today.  _____________________________________________________________________  Past Medical History        Past Medical History:   Diagnosis Date    Bradycardia      Cancer (HCC)      Cervical cancer (HCC)      Chronic back pain      LGSIL of cervix of undetermined significance 2017     HPV+    Menorrhagia      PONV (postoperative nausea and vomiting)      Prolonged emergence from general anesthesia      Uterine perforation 2017     history of during laparoscopy    Vasodepressor syncope       cardiac eval 2017 (inpatient at [facility], notes in [place])                                                                      Past Surgical History         Past Surgical History:   Procedure Laterality Date    COLPOSCOPY   2017     LORA I    HYSTERECTOMY   2018     vaginal    OK VAGINAL HYSTERECTOMY,UTERUS 250 GMS/< N/A 2018 HYSTERECTOMY VAGINAL AND REVISION OF HYMEN TEAR performed by Idalmis Hutchison MD at [facility]    SALPINGECTOMY Bilateral 2017     partial with D+C, hysteroscopy, failed NovaSure ablation    TUBAL LIGATION        WISDOM TOOTH EXTRACTION             Family History         Family History   Problem Relation Age of Onset    Other Mother           problems with irreg cycles    Heart Disease Father      Other Father           heart attack    Diabetes Maternal Grandmother           Social History           Tobacco Use   Smoking Status Current Some Day Smoker    Packs/day: 1.00    Types: Cigarettes   Smokeless Tobacco Never Used      Social History          Substance and Sexual Activity   Alcohol Use No      Current Facility-Administered Medications   No current outpatient medications on file. No current facility-administered medications for this visit. Allergies: Allergies   Allergen Reactions    Morphine Other (See Comments)       phlebitis         Gynecologic History:  Patient's last menstrual period was 2018 (exact date).   Sexually Active: Yes  STD History: No  Abnormal Pap History yes  Birth Control: No              OB History    Para Term  AB Living   4 2 2 0 2 2   SAB TAB Ectopic Molar Multiple Live Births    0 0 0 0 0 2      ______________________________________________________________________  REVIEW OF SYSTEMS:        Constitutional:             Unexpected weight change    no  Neurological:               Frequent headaches               no  Ophthalmic:                 Recent visual changes           no  ENT:                            Difficulty swallowing                no  Breast:                         Masses                                   no                                  Respiratory:                 Shortness of breath                no                      Cardiovascular:           Chest pain                               no Gastrointestinal:          Chronic diarrhea/constipation no          Urogenital:                   Urinary incontinence               no                                                           Heavy/irregular periods           no                                      Vaginal discharge                   yes  Hematological:            Bruises easy                           no                                  Endocrine:                   Nipple Discharge                    no                                      Hot/Cold Intolerance               yes   Psychological:            Mood and affect were wnl       yes                                                                                                                                            Physical Exam:    Vitals       Vitals:     08/10/21 1055   BP: 110/72   Site: Right Upper Arm   Position: Sitting   Cuff Size: Medium Adult   Weight: 111 lb (50.3 kg)   Height: 5' 3\" (1.6 m)            General Appearance:  She does not appear to be in any distress. This  is a well developed, well nourished, well groomed female. Neurological:  The patient is alert and oriented to time, place, person, and situation without any noted sensory motor deficits. Skin:  A brief inspection of the skin revealed no rashes or lesions. Neck:  The neck was supple. There is no tracheal deviation, thyromegaly or supraclavicular adenopathy appreciated. Breast:   The patients breasts were symmetrical.  Breasts are nontender and there  were no masses, discharge or pathologic skin changes. There is no supraclavicular or axillary adenopathy bilaterally. Respiratory: There was unlabored respiratory effort. Lungs clear to ascultation without wheezes, rales or rhonchi in all fields bilaterally. Cardiovascular:  Normal sinus rhythm with a regular rate and without murmur, rubs or gallops. Abdomen:   The abdomen was soft and non-tender with no guarding, rebound, CVAT or rigidity. No hernias were appreciated. Bowel sounds were normally active. Pelvic exam:  No vulvar or vaginal lesions are noted. Good support of the apex and no foreshortening present. No significant cystocele, rectocele or enterocele noted. Uterus surgically absent. On bimanual there is no nodularity but she is tender mostly at the apex and LLQ. Adnexa nontender and without abnormal masses bilaterally. Extremities:  FROM and nontender without clubbing cyanosis or edema. ASSESSMENT:             ICD-10-CM     1. Encounter for gynecological examination without abnormal finding  Z01.419 PAP SMEAR   2. Pelvic pain  R10.2 C.trachomatis N.gonorrhoeae DNA       VAGINITIS DNA PROBE   3. Hot flashes  B83.3 Follicle Stimulating Hormone       Luteinizing Hormone       Estradiol   4. Vaginal discharge  N89.8 C.trachomatis N.gonorrhoeae DNA       VAGINITIS DNA PROBE   5. Dyspareunia in female  N94.10                                                                                        PLAN:  Pelvic ultrasound ordered. Discussed probability of diagnostic laparoscopy and she was given information and website reference. Return to the office in 1 year or when necessary     Hemal Contreras M.D., [address]  [address] [facility]  [place]

## 2023-03-14 NOTE — PATIENT INSTRUCTIONS
We will make a referral to Dr. Bill Toro, colorectal surgeon to evaluate you for internal hemorrhoids. Please ask at checkout for information to establish a new PCP here. We will contact you with the results of today's Pap. Return to the office in 1 year or as needed. Please reconsider getting COVID vaccinated and have a safe and healthy 2023!

## 2023-03-15 NOTE — PROGRESS NOTES
[facility] [address] [facility] [address]      DATE OF VISIT:  3/15/23        History and Physical    Taisha Burns    :  1990  CHIEF COMPLAINT:    Chief Complaint   Patient presents with    Annual Exam     Here for annual last pap 08/10/21neg                     HPI :   Taisha Burns is a 28 y.o. femaleGRAVIDA 4 PARA  PCP: Matt Canchola PA-C    Taisha Burns returns today for her annual exam.  She presents today with multiple concerns. She states that she has been experiencing some BRB per rectum over the past year. She was seen several times at [facility] and admitted to general surgery for what sounds like a thrombosed hemorrhoid. She states that with her normal daily activities she will occasionally feel them prolapse causing intense pain to the point where she is unable to work. She does have regular BMs without complaints of constipation or diarrhea. 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 She has not been COVID vaccinated and was admitted due to complications from [facility] last year. She denies any UTI symptoms or involuntary loss of urine. Spencer also unfortunately lost her father at age 46 this past year to a massive MI. She has good support systems and seems to be coping well. She also notes that she is looking for a new PCP.   She is otherwise without any significant complaints today.  _____________________________________________________________________  Past Medical History:   Diagnosis Date    Bradycardia     Cancer ([address]     Cervical cancer (HCC)     Chronic back pain     LGSIL of cervix of undetermined significance 2017    HPV+    Menorrhagia     PONV (postoperative nausea and vomiting)     Prolonged emergence from general anesthesia     Uterine perforation 2017    history of during laparoscopy    Vasodepressor syncope     cardiac eval 2017 (inpatient at [facility], notes in [address])                                                                   Past Surgical History:   Procedure Laterality Date    COLONOSCOPY N/A 2022    COLONOSCOPY DIAGNOSTIC WITH RESOLVED EXTERNAL HEMORRHOID performed by Belen Prater MD at [address] [place] [address]  2017    LORA I    HYSTERECTOMY (CERVIX STATUS UNKNOWN)  2018    vaginal    NM VAGINAL HYSTERECTOMY UTERUS 250 GM/< N/A 2018    HYSTERECTOMY VAGINAL AND REVISION OF HYMEN TEAR performed by Balbina Menjivar MD at [place] [address]    SALPINGECTOMY Bilateral 2017    partial with D+C, hysteroscopy, failed NovaSure ablation    TUBAL LIGATION      WISDOM TOOTH EXTRACTION       Family History   Problem Relation Age of Onset    Other Mother         problems with irreg cycles    Heart Disease Father     Other Father         heart attack    Diabetes Maternal Grandmother      Social History     Tobacco Use   Smoking Status Former    Packs/day: 1.00    Types: Cigarettes    Quit date: 10/11/2021    Years since quittin.4   Smokeless Tobacco Never     Social History     Substance and Sexual Activity   Alcohol Use No     Current Outpatient Medications   Medication Sig Dispense Refill    ondansetron (ZOFRAN ODT) 4 MG disintegrating tablet Take 1 tablet by mouth every 8 hours as needed for Nausea or Vomiting 10 tablet 0    acetaminophen (TYLENOL) 500 MG tablet Take 2 tablets by mouth every 8 hours as needed for Pain 30 tablet 0    ibuprofen (IBU) 800 MG tablet Take 1 tablet by mouth every 8 hours as needed for Pain (Patient not taking: Reported on 3/14/2023) 30 tablet 0    fluticasone (FLONASE) 50 MCG/ACT nasal spray 1 spray by Each Nostril route daily (Patient not taking: Reported on 3/14/2023) 32 g 0     No current facility-administered medications for this visit. Allergies:   Allergies   Allergen Reactions    Morphine Other (See Comments)     phlebitis       Gynecologic History:  Patient's last menstrual period was 2018 (exact date). Sexually Active: Yes  STD History: No  Abnormal Pap History yes  Birth Control: No    OB History    Para Term  AB Living   4 2 2 0 2 2   SAB IAB Ectopic Molar Multiple Live Births   0 0 0 0 0 2     ______________________________________________________________________  REVIEW OF SYSTEMS:        Constitutional:  Unexpected weight change no  Neurological:  Frequent headaches  no  Ophthalmic:  Recent visual changes no  ENT:   Difficulty swallowing  no  Breast:              Masses   no     Respiratory:  Shortness of breath  no    Cardiovascular: Chest pain   no     Gastrointestinal: Chronic diarrhea/constipation no   Urogenital:  Urinary incontinence  no                                         Heavy/irregular periods           no                                      Vaginal discharge                   no  Hematological: Bruises easy   no     Endocrine:  Nipple Discharge  no     Hot/Cold Intolerance  no   Psychological:  Mood and affect were wnl yes                                                                                                                                           Physical Exam:     Vitals:    23 1447   BP: 100/62     [unfilled]  Body mass index is 18.14 kg/m². General Appearance:  She does not appear to be in any distress. This  is a well developed, well nourished, well groomed female. Neurological:  The patient is alert and oriented to time, place, person, and situation without any noted sensory motor deficits. Skin:  A brief inspection of the skin revealed no rashes or lesions. Neck:  The neck was supple. There is no tracheal deviation, thyromegaly or supraclavicular adenopathy appreciated. Breast:   The patients breasts were symmetrical.  Breasts are nontender and there  were no masses, discharge or pathologic skin changes. There is no supraclavicular or axillary adenopathy bilaterally. Respiratory:   There was unlabored respiratory effort. Lungs clear to ascultation without wheezes, rales or rhonchi in all fields bilaterally. Cardiovascular:  Normal sinus rhythm with a regular rate and without murmur, rubs or gallops. Abdomen: The abdomen was soft and non-tender with no guarding, rebound, CVAT or rigidity. No hernias were appreciated. Bowel sounds were normally active. Pelvic exam:  No vulvar or vaginal lesions are noted. Normal vaginal discharge present, no significant cystocele, rectocele or enterocele noted. Vaginal vault is well supported. Uterus surgically absent and adnexa nontender and without abnormal masses bilaterally. Rectum without external lesions noted. Normal sphincter tone and no masses appreciated. Extremities:  FROM and nontender without clubbing cyanosis or edema. ASSESSMENT:        Diagnosis Orders   1. Encounter for gynecological examination without abnormal finding  PAP SMEAR      2. Bleeding hemorrhoids  External Referral To Proctology                      PLAN:  If Negative Cytology, Follow-up screening per current guidelines. Mammograms every 1year. If 35 yo and last mammogram was negative. Mixed incontinence - discussed bladder training and kegel exercises. Info given. Calcium and Vitamin D dosing reviewed. Colonoscopy screening reviewed as well as onset for bone density testing. Birth control and barrier recommendations discussed. STD counseling and prevention reviewed. Routine health maintenance per patients PCP. Recommend COVID vaccination. Referral to Dr. Porfirio Wylie made. Return to the office in 1 year or when necessary  Patient was seen with total face to face time of 20 minutes. Leyda Darby M.D., [facility], [facility]  [address] [facility]  [place]

## 2023-03-22 LAB — CYTOLOGY REPORT: NORMAL

## 2023-03-23 ENCOUNTER — HOSPITAL ENCOUNTER (OUTPATIENT)
Age: 33
Discharge: HOME | End: 2023-03-23
Payer: MEDICAID

## 2023-03-23 ENCOUNTER — TELEPHONE (OUTPATIENT)
Dept: OBGYN CLINIC | Age: 33
End: 2023-03-23

## 2023-03-23 DIAGNOSIS — Z20.5 CONTACT WITH AND (SUSPECTED) EXPOSURE TO VIRAL HEPATITIS: Primary | ICD-10-CM

## 2023-03-23 DIAGNOSIS — Z20.5 CONTACT WITH AND (SUSPECTED) EXPOSURE TO VIRAL HEPATITIS: ICD-10-CM

## 2023-03-23 LAB
HAV AB SERPL IA-ACNC: NONREACTIVE
HBV CORE AB SER QL: NONREACTIVE
HBV SURFACE AB SERPL IA-ACNC: >1000 MIU/ML
HBV SURFACE AG SER QL: NONREACTIVE
HCV AB SER QL: NONREACTIVE
HIV 1+2 AB+HIV1 P24 AG SERPL QL IA: NONREACTIVE

## 2023-03-23 PROCEDURE — 86707 HEPATITIS BE ANTIBODY: CPT

## 2023-03-23 PROCEDURE — 86704 HEP B CORE ANTIBODY TOTAL: CPT

## 2023-03-23 PROCEDURE — 86317 IMMUNOASSAY INFECTIOUS AGENT: CPT

## 2023-03-23 PROCEDURE — 87340 HEPATITIS B SURFACE AG IA: CPT

## 2023-03-23 PROCEDURE — 86803 HEPATITIS C AB TEST: CPT

## 2023-03-23 PROCEDURE — 36415 COLL VENOUS BLD VENIPUNCTURE: CPT

## 2023-03-23 PROCEDURE — 87389 HIV-1 AG W/HIV-1&-2 AB AG IA: CPT

## 2023-03-23 PROCEDURE — 86708 HEPATITIS A ANTIBODY: CPT

## 2023-03-23 NOTE — TELEPHONE ENCOUNTER
Pt has a questions about hep b and hep c    Pt advised that Yoselin Fisher will call her back once she is able.

## 2023-03-23 NOTE — TELEPHONE ENCOUNTER
After talking with Dr Violet Collins we ordered hepatitis panel and HIV told Albert Escobedoy to follow up with her Primary care Doctor and get blood work done she wanted to know about if her partners outbreak was recent and told her impossible to tell encourage there to call primary care and go to follow up appointments  with partner she understood

## 2023-03-24 ENCOUNTER — PATIENT MESSAGE (OUTPATIENT)
Dept: PRIMARY CARE CLINIC | Age: 33
End: 2023-03-24

## 2023-03-24 DIAGNOSIS — R20.2 PARESTHESIA AND PAIN OF BOTH UPPER EXTREMITIES: Primary | ICD-10-CM

## 2023-03-24 DIAGNOSIS — M79.602 PARESTHESIA AND PAIN OF BOTH UPPER EXTREMITIES: Primary | ICD-10-CM

## 2023-03-24 DIAGNOSIS — M54.16 RADICULOPATHY, LUMBAR REGION: ICD-10-CM

## 2023-03-24 DIAGNOSIS — M79.601 PARESTHESIA AND PAIN OF BOTH UPPER EXTREMITIES: Primary | ICD-10-CM

## 2023-03-24 NOTE — TELEPHONE ENCOUNTER
From: Yaneth Chavez  To: Donnell Kyle  Sent: 3/24/2023 2:04 PM EDT  Subject: Imaging     Hi , I wanted to know if someone could reach out to dr. Bobby Machado and see if he is comfortable in ordering a mri of the cervical and lumbar spine : I got my ultra sound done of the legs and it was normal and I work with dr Trae Johnson who read my images for my ultra sound and me and him talked at work and dr Trae Johnson so he thinks I have an issue with my spine or nerves which is causing all of my pain and numbness and tingling. Please let me know if he can will put in an order for mri of cervical and lumbar spine .  Thank you

## 2023-03-25 LAB — HEPATITIS BE ANTIBODY: NEGATIVE

## 2023-04-06 ENCOUNTER — HOSPITAL ENCOUNTER (OUTPATIENT)
Dept: MRI IMAGING | Facility: CLINIC | Age: 33
Discharge: HOME | End: 2023-04-08
Payer: MEDICAID

## 2023-04-06 ENCOUNTER — PATIENT MESSAGE (OUTPATIENT)
Dept: PRIMARY CARE CLINIC | Age: 33
End: 2023-04-06

## 2023-04-06 DIAGNOSIS — M79.602 PARESTHESIA AND PAIN OF BOTH UPPER EXTREMITIES: ICD-10-CM

## 2023-04-06 DIAGNOSIS — R20.2 PARESTHESIA AND PAIN OF BOTH UPPER EXTREMITIES: ICD-10-CM

## 2023-04-06 DIAGNOSIS — M54.16 RADICULOPATHY, LUMBAR REGION: ICD-10-CM

## 2023-04-06 DIAGNOSIS — M79.601 PARESTHESIA AND PAIN OF BOTH UPPER EXTREMITIES: ICD-10-CM

## 2023-04-06 DIAGNOSIS — F41.9 EPISODE OF ANXIETY: Primary | ICD-10-CM

## 2023-04-06 PROCEDURE — 72148 MRI LUMBAR SPINE W/O DYE: CPT

## 2023-04-06 RX ORDER — DIAZEPAM 5 MG/1
TABLET ORAL
Qty: 2 TABLET | Refills: 0 | Status: SHIPPED | OUTPATIENT
Start: 2023-04-06 | End: 2023-04-13

## 2023-04-06 NOTE — TELEPHONE ENCOUNTER
From: Kelin Diaz  To: Effie Camacho  Sent: 4/6/2023 1:46 PM EDT  Subject: MRI     Good afternoon,     Can someone ask the doctor if he can give me a valium prescription for the mri that I have schedule for Thursday.  I went and did the lumbar portion today and had a panic attack and I tried to do the cervical and I couldnt make it through and they have it re scheduled for Thursday the 13th next week thank you

## 2023-10-17 ENCOUNTER — HOSPITAL ENCOUNTER (EMERGENCY)
Age: 33
Discharge: HOME | End: 2023-10-17
Attending: EMERGENCY MEDICINE

## 2023-10-17 ENCOUNTER — APPOINTMENT (OUTPATIENT)
Dept: CT IMAGING | Age: 33
End: 2023-10-17

## 2023-10-17 VITALS
WEIGHT: 110 LBS | BODY MASS INDEX: 18.33 KG/M2 | OXYGEN SATURATION: 93 % | TEMPERATURE: 98 F | SYSTOLIC BLOOD PRESSURE: 106 MMHG | DIASTOLIC BLOOD PRESSURE: 84 MMHG | HEIGHT: 65 IN | HEART RATE: 77 BPM | RESPIRATION RATE: 16 BRPM

## 2023-10-17 DIAGNOSIS — R59.1 LYMPHADENOPATHY: Primary | ICD-10-CM

## 2023-10-17 LAB
ALBUMIN SERPL-MCNC: 5 G/DL (ref 3.5–5.2)
ALP SERPL-CCNC: 53 U/L (ref 35–104)
ALT SERPL-CCNC: 14 U/L (ref 5–33)
ANION GAP SERPL CALCULATED.3IONS-SCNC: 10 MMOL/L (ref 9–17)
AST SERPL-CCNC: 16 U/L
BASOPHILS # BLD: 0.04 K/UL (ref 0–0.2)
BASOPHILS NFR BLD: 1 % (ref 0–2)
BILIRUB SERPL-MCNC: 0.3 MG/DL (ref 0.3–1.2)
BUN SERPL-MCNC: 8 MG/DL (ref 6–20)
BUN/CREAT SERPL: 13 (ref 9–20)
CALCIUM SERPL-MCNC: 9.6 MG/DL (ref 8.6–10.4)
CHLORIDE SERPL-SCNC: 103 MMOL/L (ref 98–107)
CO2 SERPL-SCNC: 28 MMOL/L (ref 20–31)
CREAT SERPL-MCNC: 0.6 MG/DL (ref 0.5–0.9)
EOSINOPHIL # BLD: 0.06 K/UL (ref 0–0.44)
EOSINOPHILS RELATIVE PERCENT: 1 % (ref 1–4)
ERYTHROCYTE [DISTWIDTH] IN BLOOD BY AUTOMATED COUNT: 12.3 % (ref 11.8–14.4)
GFR SERPL CREATININE-BSD FRML MDRD: >60 ML/MIN/1.73M2
GLUCOSE SERPL-MCNC: 106 MG/DL (ref 70–99)
HCT VFR BLD AUTO: 40.9 % (ref 36.3–47.1)
HGB BLD-MCNC: 13.7 G/DL (ref 11.9–15.1)
IMM GRANULOCYTES # BLD AUTO: 0.01 K/UL (ref 0–0.3)
IMM GRANULOCYTES NFR BLD: 0 %
LYMPHOCYTES NFR BLD: 3.92 K/UL (ref 1.1–3.7)
LYMPHOCYTES RELATIVE PERCENT: 49 % (ref 24–43)
MCH RBC QN AUTO: 31.6 PG (ref 25.2–33.5)
MCHC RBC AUTO-ENTMCNC: 33.5 G/DL (ref 28.4–34.8)
MCV RBC AUTO: 94.5 FL (ref 82.6–102.9)
MONOCYTES NFR BLD: 0.51 K/UL (ref 0.1–1.2)
MONOCYTES NFR BLD: 7 % (ref 3–12)
NEUTROPHILS NFR BLD: 42 % (ref 36–65)
NEUTS SEG NFR BLD: 3.24 K/UL (ref 1.5–8.1)
NRBC BLD-RTO: 0 PER 100 WBC
PLATELET # BLD AUTO: 268 K/UL (ref 138–453)
PMV BLD AUTO: 10.5 FL (ref 8.1–13.5)
POTASSIUM SERPL-SCNC: 3.7 MMOL/L (ref 3.7–5.3)
PROT SERPL-MCNC: 8.1 G/DL (ref 6.4–8.3)
RBC # BLD AUTO: 4.33 M/UL (ref 3.95–5.11)
SODIUM SERPL-SCNC: 141 MMOL/L (ref 135–144)
WBC OTHER # BLD: 7.8 K/UL (ref 3.5–11.3)

## 2023-10-17 PROCEDURE — 2580000003 HC RX 258: Performed by: EMERGENCY MEDICINE

## 2023-10-17 PROCEDURE — 85025 COMPLETE CBC W/AUTO DIFF WBC: CPT

## 2023-10-17 PROCEDURE — 6360000004 HC RX CONTRAST MEDICATION: Performed by: EMERGENCY MEDICINE

## 2023-10-17 PROCEDURE — 71260 CT THORAX DX C+: CPT

## 2023-10-17 PROCEDURE — 99285 EMERGENCY DEPT VISIT HI MDM: CPT

## 2023-10-17 PROCEDURE — 80053 COMPREHEN METABOLIC PANEL: CPT

## 2023-10-17 RX ORDER — 0.9 % SODIUM CHLORIDE 0.9 %
80 INTRAVENOUS SOLUTION INTRAVENOUS ONCE
Status: COMPLETED | OUTPATIENT
Start: 2023-10-17 | End: 2023-10-17

## 2023-10-17 RX ORDER — ACETAMINOPHEN AND CODEINE PHOSPHATE 300; 30 MG/1; MG/1
1 TABLET ORAL EVERY 4 HOURS PRN
Qty: 10 TABLET | Refills: 0 | Status: SHIPPED | OUTPATIENT
Start: 2023-10-17 | End: 2023-10-22

## 2023-10-17 RX ORDER — SODIUM CHLORIDE 0.9 % (FLUSH) 0.9 %
10 SYRINGE (ML) INJECTION PRN
Status: DISCONTINUED | OUTPATIENT
Start: 2023-10-17 | End: 2023-10-18 | Stop reason: HOSPADM

## 2023-10-17 RX ADMIN — IOPAMIDOL 75 ML: 755 INJECTION, SOLUTION INTRAVENOUS at 20:20

## 2023-10-17 RX ADMIN — SODIUM CHLORIDE, PRESERVATIVE FREE 10 ML: 5 INJECTION INTRAVENOUS at 20:20

## 2023-10-17 RX ADMIN — SODIUM CHLORIDE 80 ML: 9 INJECTION, SOLUTION INTRAVENOUS at 20:20

## 2023-10-17 ASSESSMENT — PAIN - FUNCTIONAL ASSESSMENT: PAIN_FUNCTIONAL_ASSESSMENT: 0-10

## 2023-10-17 ASSESSMENT — PAIN SCALES - GENERAL: PAINLEVEL_OUTOF10: 5

## 2023-10-17 NOTE — ED NOTES
Pt reports noticing a lump to R underarm months ago and bilat breast pain x past 7 months. States she did not go to the doctor or come to the hospital d/t losing medical insurance after getting a raise at work. C/o swelling to R upper posterior shoulder. States she has noticed increase in tension to R underarm when moving R arm during her workday.       Olivia Merida RN  10/17/23 [id number]

## 2023-10-17 NOTE — ED PROVIDER NOTES
EMERGENCY DEPARTMENT ENCOUNTER    Pt Name: Jeovany Clark  MRN: [id number]  [address] [facility] [place] 1990  Date of evaluation: 10/17/23  CHIEF COMPLAINT       Chief Complaint   Patient presents with    Mass     In arm pit, with right breast tenderness, and swelling radiating to shoulder     HISTORY OF PRESENT ILLNESS   26-year-old female presents emergency room with lymphadenopathy most notable under her right axilla slightly less to the left. Symptoms have been going on for several months. The swelling has worsened and become more painful. Patient thought for a long time it was just some swollen lymph nodes and thought it would go away. Now that it has been several months she has some increased concerns. Patient reports history of cervical cancer and total hysterectomy. REVIEW OF SYSTEMS     Review of Systems   Hematological:  Positive for adenopathy. All other systems reviewed and are negative.     PASTMEDICAL HISTORY     Past Medical History:   Diagnosis Date    Bradycardia     Cancer (HCC)     Cervical cancer (HCC)     Chronic back pain     LGSIL of cervix of undetermined significance 04/04/2017    HPV+    Menorrhagia     PONV (postoperative nausea and vomiting)     Prolonged emergence from general anesthesia     Uterine perforation 08/2017    history of during laparoscopy    Vasodepressor syncope     cardiac eval 8/2017 (inpatient at [facility], notes in [address])     Past Problem List  Patient Active Problem List   Diagnosis Code    History of bilateral tubal ligation Z98.51    CIN1 on colposcopy (5/18/17) Z87.410    Menorrhagia N92.0    Bradycardia R00.1    Chronic back pain M54.9, G89.29    H/O LSIL HRHPV+ pap 4/4/17 Z87.42    H/O endometriosis Z87.42    Pelvic pain in female R10.2    Menometrorrhagia N92.1    Uterine fibroid D25.9    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

## 2023-10-18 ENCOUNTER — TELEPHONE (OUTPATIENT)
Dept: PRIMARY CARE CLINIC | Age: 33
End: 2023-10-18

## 2023-10-18 DIAGNOSIS — R59.0 AXILLARY LYMPHADENOPATHY: Primary | ICD-10-CM

## 2023-10-18 NOTE — DISCHARGE INSTRUCTIONS
As we discussed neck steps would include ultrasound and possible biopsy of the lymph nodes. It will be important that you do get follow-up regarding further testing and evaluation. You may return to the emergency room if symptoms worsen.

## 2023-10-18 NOTE — TELEPHONE ENCOUNTER
Patient called in stating she has made an appointment with PCP for 10/26/23. However, patient does not have insurance. Patient ended up in the ER for bilateral breast pain and swelling in her armpits and lymph nodes. The ER physician ordered an [address] of her right breast due to feeling more prominent swollen lymph nodes in the right side but patient notes that ER MD did feel it on her left side as well. The scheduling department will not schedule the US Breast without getting a Diagnostic bilateral mammogram due to patient having a concern and being over the age of 27. The ER also recommended patient to ask PCP to order a biopsy so that pathology results will come to PCP instead of the ER. Due to not having insurance, patient wanted to ask if PCP would be willing to order the diagnostic mammogram and possibly the biopsy prior to seeing her in the office as she is concerned financially and if she could postpone seeing him until after she has the testing done.

## 2023-10-18 NOTE — ED NOTES
Pt transported to CT scan via wheelchair. Accompanied by CT tech.       Susy Santacruz RN  10/17/23 2018

## 2023-10-19 NOTE — TELEPHONE ENCOUNTER
I have ordered diagnostic mammogram bilaterally. I also put in orders for ultrasound bilaterally. Pending these results we will order biopsy before patient is seen in office. Thank you for the update.

## 2023-11-10 ENCOUNTER — HOSPITAL ENCOUNTER (OUTPATIENT)
Dept: WOMENS IMAGING | Age: 33
End: 2023-11-10

## 2023-11-10 ENCOUNTER — HOSPITAL ENCOUNTER (OUTPATIENT)
Dept: WOMENS IMAGING | Age: 33
Discharge: HOME | End: 2023-11-10

## 2023-11-10 VITALS — WEIGHT: 110 LBS | HEIGHT: 65 IN | BODY MASS INDEX: 18.33 KG/M2

## 2023-11-10 DIAGNOSIS — R59.0 AXILLARY LYMPHADENOPATHY: ICD-10-CM

## 2023-11-10 PROCEDURE — 76882 US LMTD JT/FCL EVL NVASC XTR: CPT

## 2023-11-10 PROCEDURE — G0279 TOMOSYNTHESIS, MAMMO: HCPCS

## 2023-11-10 PROCEDURE — 76881 US COMPL JOINT R-T W/IMG: CPT

## 2024-03-04 ENCOUNTER — HOSPITAL ENCOUNTER (OUTPATIENT)
Age: 34
Setting detail: SPECIMEN
Discharge: HOME | End: 2024-03-04

## 2024-03-05 LAB
HIV 1+2 AB+HIV1 P24 AG SERPL QL IA: NONREACTIVE
T PALLIDUM AB SER QL IA: NONREACTIVE

## 2024-03-07 LAB
HSV1 IGG SERPL QL IA: 0.16
HSV1+2 IGM SER QL IA: 0.67
HSV2 AB SER QL IA: NORMAL

## 2024-03-12 LAB
HSV1 IGG SERPL QL IA: 0.16
HSV1+2 IGM SER QL IA: 0.67
HSV2 AB SER QL IA: 0.02

## 2024-04-06 ENCOUNTER — APPOINTMENT (OUTPATIENT)
Dept: CT IMAGING | Age: 34
End: 2024-04-06

## 2024-04-06 ENCOUNTER — HOSPITAL ENCOUNTER (EMERGENCY)
Age: 34
Discharge: HOME | End: 2024-04-06
Attending: EMERGENCY MEDICINE

## 2024-04-06 VITALS
DIASTOLIC BLOOD PRESSURE: 94 MMHG | OXYGEN SATURATION: 100 % | BODY MASS INDEX: 18.66 KG/M2 | TEMPERATURE: 98 F | RESPIRATION RATE: 18 BRPM | SYSTOLIC BLOOD PRESSURE: 132 MMHG | HEIGHT: 65 IN | WEIGHT: 112 LBS | HEART RATE: 114 BPM

## 2024-04-06 DIAGNOSIS — R31.9 URINARY TRACT INFECTION WITH HEMATURIA, SITE UNSPECIFIED: Primary | ICD-10-CM

## 2024-04-06 DIAGNOSIS — N39.0 URINARY TRACT INFECTION WITH HEMATURIA, SITE UNSPECIFIED: Primary | ICD-10-CM

## 2024-04-06 DIAGNOSIS — N83.209 CYST OF OVARY, UNSPECIFIED LATERALITY: ICD-10-CM

## 2024-04-06 LAB
ANION GAP SERPL CALCULATED.3IONS-SCNC: 10 MMOL/L (ref 9–17)
BASOPHILS # BLD: 0.04 K/UL (ref 0–0.2)
BASOPHILS NFR BLD: 0 % (ref 0–2)
BILIRUB UR QL STRIP: NEGATIVE
BUN SERPL-MCNC: 10 MG/DL (ref 6–20)
BUN/CREAT SERPL: 17 (ref 9–20)
CALCIUM SERPL-MCNC: 9.2 MG/DL (ref 8.6–10.4)
CHLORIDE SERPL-SCNC: 102 MMOL/L (ref 98–107)
CLARITY UR: ABNORMAL
CO2 SERPL-SCNC: 26 MMOL/L (ref 20–31)
COLOR UR: ABNORMAL
CREAT SERPL-MCNC: 0.6 MG/DL (ref 0.5–0.9)
EOSINOPHIL # BLD: 0.03 K/UL (ref 0–0.44)
EOSINOPHILS RELATIVE PERCENT: 0 % (ref 1–4)
EPI CELLS #/AREA URNS HPF: ABNORMAL /HPF (ref 0–5)
ERYTHROCYTE [DISTWIDTH] IN BLOOD BY AUTOMATED COUNT: 12.4 % (ref 11.8–14.4)
GFR SERPL CREATININE-BSD FRML MDRD: >90 ML/MIN/1.73M2
GLUCOSE SERPL-MCNC: 90 MG/DL (ref 70–99)
GLUCOSE UR STRIP-MCNC: NEGATIVE MG/DL
HCG UR QL: NEGATIVE
HCT VFR BLD AUTO: 38.8 % (ref 36.3–47.1)
HGB BLD-MCNC: 13.1 G/DL (ref 11.9–15.1)
HGB UR QL STRIP.AUTO: ABNORMAL
IMM GRANULOCYTES # BLD AUTO: 0.03 K/UL (ref 0–0.3)
IMM GRANULOCYTES NFR BLD: 0 %
KETONES UR STRIP-MCNC: ABNORMAL MG/DL
LEUKOCYTE ESTERASE UR QL STRIP: ABNORMAL
LYMPHOCYTES NFR BLD: 2.41 K/UL (ref 1.1–3.7)
LYMPHOCYTES RELATIVE PERCENT: 20 % (ref 24–43)
MCH RBC QN AUTO: 31.5 PG (ref 25.2–33.5)
MCHC RBC AUTO-ENTMCNC: 33.8 G/DL (ref 28.4–34.8)
MCV RBC AUTO: 93.3 FL (ref 82.6–102.9)
MONOCYTES NFR BLD: 0.84 K/UL (ref 0.1–1.2)
MONOCYTES NFR BLD: 7 % (ref 3–12)
NEUTROPHILS NFR BLD: 73 % (ref 36–65)
NEUTS SEG NFR BLD: 8.69 K/UL (ref 1.5–8.1)
NITRITE UR QL STRIP: POSITIVE
NRBC BLD-RTO: 0 PER 100 WBC
PH UR STRIP: 6.5 [PH] (ref 5–8)
PLATELET # BLD AUTO: 253 K/UL (ref 138–453)
PMV BLD AUTO: 10.8 FL (ref 8.1–13.5)
POTASSIUM SERPL-SCNC: 3.6 MMOL/L (ref 3.7–5.3)
PROT UR STRIP-MCNC: ABNORMAL MG/DL
RBC # BLD AUTO: 4.16 M/UL (ref 3.95–5.11)
RBC #/AREA URNS HPF: ABNORMAL /HPF (ref 0–2)
SODIUM SERPL-SCNC: 138 MMOL/L (ref 135–144)
SP GR UR STRIP: 1.02 (ref 1–1.03)
UROBILINOGEN UR STRIP-ACNC: ABNORMAL EU/DL (ref 0–1)
WBC #/AREA URNS HPF: ABNORMAL /HPF (ref 0–5)
WBC OTHER # BLD: 12 K/UL (ref 3.5–11.3)
YEAST URNS QL MICRO: ABNORMAL

## 2024-04-06 PROCEDURE — 74176 CT ABD & PELVIS W/O CONTRAST: CPT

## 2024-04-06 PROCEDURE — 85025 COMPLETE CBC W/AUTO DIFF WBC: CPT

## 2024-04-06 PROCEDURE — 81001 URINALYSIS AUTO W/SCOPE: CPT

## 2024-04-06 PROCEDURE — 81025 URINE PREGNANCY TEST: CPT

## 2024-04-06 PROCEDURE — 80048 BASIC METABOLIC PNL TOTAL CA: CPT

## 2024-04-06 PROCEDURE — 6370000000 HC RX 637 (ALT 250 FOR IP): Performed by: PHYSICIAN ASSISTANT

## 2024-04-06 PROCEDURE — 2580000003 HC RX 258: Performed by: PHYSICIAN ASSISTANT

## 2024-04-06 PROCEDURE — 99284 EMERGENCY DEPT VISIT MOD MDM: CPT

## 2024-04-06 RX ORDER — CEPHALEXIN 500 MG/1
500 CAPSULE ORAL ONCE
Status: COMPLETED | OUTPATIENT
Start: 2024-04-06 | End: 2024-04-06

## 2024-04-06 RX ORDER — ACETAMINOPHEN AND CODEINE PHOSPHATE 300; 30 MG/1; MG/1
1-2 TABLET ORAL EVERY 8 HOURS PRN
Qty: 12 TABLET | Refills: 0 | Status: SHIPPED | OUTPATIENT
Start: 2024-04-06 | End: 2024-04-11

## 2024-04-06 RX ORDER — 0.9 % SODIUM CHLORIDE 0.9 %
1000 INTRAVENOUS SOLUTION INTRAVENOUS ONCE
Status: COMPLETED | OUTPATIENT
Start: 2024-04-06 | End: 2024-04-06

## 2024-04-06 RX ORDER — CEPHALEXIN 500 MG/1
500 CAPSULE ORAL 2 TIMES DAILY
Qty: 20 CAPSULE | Refills: 0 | Status: SHIPPED | OUTPATIENT
Start: 2024-04-06 | End: 2024-04-16

## 2024-04-06 RX ORDER — ACETAMINOPHEN AND CODEINE PHOSPHATE 300; 30 MG/1; MG/1
1 TABLET ORAL ONCE
Status: COMPLETED | OUTPATIENT
Start: 2024-04-06 | End: 2024-04-06

## 2024-04-06 RX ORDER — PHENAZOPYRIDINE HYDROCHLORIDE 200 MG/1
200 TABLET, FILM COATED ORAL 3 TIMES DAILY PRN
Qty: 6 TABLET | Refills: 0 | Status: SHIPPED | OUTPATIENT
Start: 2024-04-06 | End: 2024-04-09

## 2024-04-06 RX ADMIN — CEPHALEXIN 500 MG: 500 CAPSULE ORAL at 17:35

## 2024-04-06 RX ADMIN — SODIUM CHLORIDE 1000 ML: 9 INJECTION, SOLUTION INTRAVENOUS at 15:47

## 2024-04-06 RX ADMIN — ACETAMINOPHEN AND CODEINE PHOSPHATE 1 TABLET: 300; 30 TABLET ORAL at 17:35

## 2024-04-06 ASSESSMENT — PAIN SCALES - GENERAL
PAINLEVEL_OUTOF10: 7
PAINLEVEL_OUTOF10: 9

## 2024-04-06 ASSESSMENT — PAIN - FUNCTIONAL ASSESSMENT: PAIN_FUNCTIONAL_ASSESSMENT: 0-10

## 2024-04-06 NOTE — ED NOTES
Pt presents to ER from home due to pelvic pain and hematuria. Pt states pelvic pain and hematuria started today. Pt states S/S started today. Pt denies exposure to STD or pregnancy. Pt denies SOB. Chest pain, nausea and vomiting. Pt states being sent to ER by urgent care. Pt states hx of kidney stones.Pt is A/O x 4, equal chest expansion with non labored breathing, wheels locked, bed in lowest position, call light in reach.

## 2024-04-06 NOTE — PROGRESS NOTES
Transitions of Care Pharmacy Service   Medication Review    The patient's list of current home medications has been reviewed.     Source(s) of information: Patient - takes no medications at home    Based on information provided by the above source(s), I have updated the patient's home med list.       Please feel free to call me with any questions about this encounter. Thank you.    Mica Nevarez RPH   Transitions of Care Pharmacy Service  Phone:  [phone]  Fax: [phone]      Electronically signed by Mica Nevarez RPH on 4/6/2024 at 4:55 PM           For Pharmacy Admin Tracking Only  # meds added to list: 0  # meds removed from list: 4  # meds adjusted on list (dose/frequency/formulation): 0

## 2024-04-06 NOTE — DISCHARGE INSTRUCTIONS
Take meds as prescribed.  Follow up with doctor  in 3 -4 days.  Return to ER immediately if symptoms worsen or persist.    Do not drive, operate machinery, climb or engage in any dangerous activity while taking narcotics or tylenol with codeine

## 2024-04-06 NOTE — ED PROVIDER NOTES
[facility] ED  eMERGENCY dEPARTMENTeNCOUnter      Pt Name: Sarah Cee  MRN: [id number]  Birthdate 1990  Date ofevaluation: 4/6/2024  Provider: Pedro Pablo Che PA-C    CHIEF COMPLAINT       Chief Complaint   Patient presents with    Pelvic Pain    Hematuria         HISTORY OF PRESENT ILLNESS  (Location/Symptom, Timing/Onset, Context/Setting, Quality, Duration, Modifying Factors, Severity.)   Sarah Cee is a 33 y.o. female who presents to the emergency department with pelvic pain and hematuria started earlier today.  Was seen in urgent care and referred to the ER for further evaluation.  Denies any fevers or chills.      Nursing Notes were reviewed.    ALLERGIES     Morphine    CURRENT MEDICATIONS       Previous Medications    No medications on file       PAST MEDICAL HISTORY         Diagnosis Date    Bradycardia     Cancer (HCC)     Cervical cancer (HCC)     Chronic back pain     LGSIL of cervix of undetermined significance 04/04/2017    HPV+    Menorrhagia     PONV (postoperative nausea and vomiting)     Prolonged emergence from general anesthesia     Uterine perforation 08/2017    history of during laparoscopy    Vasodepressor syncope     cardiac eval 8/2017 (inpatient at [facility], notes in Epic)       SURGICAL HISTORY           Procedure Laterality Date    COLONOSCOPY N/A 4/29/2022    COLONOSCOPY DIAGNOSTIC WITH RESOLVED EXTERNAL HEMORRHOID performed by Jm Jaime MD at [facility] OR    COLPOSCOPY  05/18/2017    LORA I    HYSTERECTOMY (CERVIX STATUS UNKNOWN)  01/31/2018    vaginal    GA VAGINAL HYSTERECTOMY UTERUS 250 GM/< N/A 1/31/2018    HYSTERECTOMY VAGINAL AND REVISION OF HYMEN TEAR performed by Russ Jacobson MD at [facility] OR    SALPINGECTOMY Bilateral 08/18/2017    partial with D+C, hysteroscopy, failed NovaSure ablation    TUBAL LIGATION      WISDOM TOOTH EXTRACTION           HISTORY           Problem Relation Age of Onset    Other Mother         problems with irreg cycles

## 2024-04-10 NOTE — ED PROVIDER NOTES
Pt Name: Sarah Cee  MRN: [id number]  Birthdate 1990  Date of evaluation: 4/10/24   Sarah Cee is a 33 y.o. female with CC: Pelvic Pain and Hematuria    MDM:   I performed a substantive part of the MDM during the patient's E/M visit. I personally made or approved the documented management plan and acknowledge its risk of complications.           CRITICAL CARE:       EKG: All EKG's are interpreted by the Emergency Department Physician who either signs or Co-signs this chart in the absence of a cardiologist.      RADIOLOGY:All plain film, CT, MRI, and formal ultrasound images (except ED bedside ultrasound) are read by the radiologist, see reports below, unless otherwise noted in MDM or here.  CT ABDOMEN PELVIS WO CONTRAST Additional Contrast? None   Final Result   1. No ureteral stone or other acute finding in the abdomen or pelvis.   2. 3 cm left ovarian cyst which is elongated suggesting that it may be   involuting.  There is also a 1.9 cm cyst or dominant follicle in the right   ovary.  No follow-up imaging is recommended.   3. 2 mm left intrarenal calculus.           LABS: All lab results were reviewed by myself, and all abnormals are listed below.  Labs Reviewed   URINALYSIS - Abnormal; Notable for the following components:       Result Value    Color, UA Red (*)     Turbidity UA Cloudy (*)     Ketones, Urine 2+ (*)     Urine Hgb 3+ (*)     Protein, UA 3+ (*)     Nitrite, Urine POSITIVE (*)     Leukocyte Esterase, Urine MOD (*)     All other components within normal limits   CBC WITH AUTO DIFFERENTIAL - Abnormal; Notable for the following components:    WBC 12.0 (*)     Neutrophils % 73 (*)     Lymphocytes % 20 (*)     Eosinophils % 0 (*)     Neutrophils Absolute 8.69 (*)     All other components within normal limits   BASIC METABOLIC PANEL - Abnormal; Notable for the following components:    Potassium 3.6 (*)     All other components within normal limits   MICROSCOPIC URINALYSIS - Abnormal; Notable for

## 2024-05-03 ENCOUNTER — HOSPITAL ENCOUNTER (EMERGENCY)
Age: 34
Discharge: HOME | End: 2024-05-03
Attending: EMERGENCY MEDICINE

## 2024-05-03 VITALS
TEMPERATURE: 98.1 F | DIASTOLIC BLOOD PRESSURE: 88 MMHG | RESPIRATION RATE: 17 BRPM | HEIGHT: 65 IN | OXYGEN SATURATION: 98 % | SYSTOLIC BLOOD PRESSURE: 127 MMHG | WEIGHT: 113 LBS | BODY MASS INDEX: 18.83 KG/M2 | HEART RATE: 102 BPM

## 2024-05-03 DIAGNOSIS — N30.01 ACUTE CYSTITIS WITH HEMATURIA: Primary | ICD-10-CM

## 2024-05-03 LAB
BACTERIA URNS QL MICRO: ABNORMAL
BILIRUB UR QL STRIP: ABNORMAL
CLARITY UR: ABNORMAL
COLOR UR: ABNORMAL
EPI CELLS #/AREA URNS HPF: ABNORMAL /HPF (ref 0–5)
GLUCOSE UR STRIP-MCNC: NEGATIVE MG/DL
HGB UR QL STRIP.AUTO: ABNORMAL
KETONES UR STRIP-MCNC: ABNORMAL MG/DL
LEUKOCYTE ESTERASE UR QL STRIP: ABNORMAL
NITRITE UR QL STRIP: POSITIVE
PH UR STRIP: 5 [PH] (ref 5–8)
PROT UR STRIP-MCNC: ABNORMAL MG/DL
RBC #/AREA URNS HPF: ABNORMAL /HPF (ref 0–2)
SP GR UR STRIP: 1.03 (ref 1–1.03)
UROBILINOGEN UR STRIP-ACNC: ABNORMAL EU/DL (ref 0–1)
WBC #/AREA URNS HPF: ABNORMAL /HPF (ref 0–5)
YEAST URNS QL MICRO: ABNORMAL

## 2024-05-03 PROCEDURE — 87088 URINE BACTERIA CULTURE: CPT

## 2024-05-03 PROCEDURE — 81001 URINALYSIS AUTO W/SCOPE: CPT

## 2024-05-03 PROCEDURE — 99283 EMERGENCY DEPT VISIT LOW MDM: CPT

## 2024-05-03 PROCEDURE — 87186 SC STD MICRODIL/AGAR DIL: CPT

## 2024-05-03 PROCEDURE — 6370000000 HC RX 637 (ALT 250 FOR IP): Performed by: EMERGENCY MEDICINE

## 2024-05-03 PROCEDURE — 87086 URINE CULTURE/COLONY COUNT: CPT

## 2024-05-03 RX ORDER — CIPROFLOXACIN 500 MG/1
500 TABLET, FILM COATED ORAL 2 TIMES DAILY
Qty: 20 TABLET | Refills: 0 | Status: SHIPPED | OUTPATIENT
Start: 2024-05-03 | End: 2024-05-13

## 2024-05-03 RX ORDER — PHENAZOPYRIDINE HYDROCHLORIDE 200 MG/1
200 TABLET, FILM COATED ORAL ONCE
Status: COMPLETED | OUTPATIENT
Start: 2024-05-03 | End: 2024-05-03

## 2024-05-03 RX ORDER — CIPROFLOXACIN 500 MG/1
500 TABLET, FILM COATED ORAL ONCE
Status: COMPLETED | OUTPATIENT
Start: 2024-05-03 | End: 2024-05-03

## 2024-05-03 RX ORDER — PHENAZOPYRIDINE HYDROCHLORIDE 100 MG/1
100 TABLET, FILM COATED ORAL 3 TIMES DAILY PRN
Qty: 9 TABLET | Refills: 0 | Status: SHIPPED | OUTPATIENT
Start: 2024-05-03 | End: 2024-05-06

## 2024-05-03 RX ADMIN — PHENAZOPYRIDINE HYDROCHLORIDE 200 MG: 200 TABLET ORAL at 01:15

## 2024-05-03 RX ADMIN — CIPROFLOXACIN 500 MG: 500 TABLET, FILM COATED ORAL at 01:15

## 2024-05-03 NOTE — DISCHARGE INSTRUCTIONS
I recommend getting plenty of fluids.  This will help to flush the bacteria out of your system.  Take antibiotics for the full course.  Pyridium can help with bladder spasms and irritation.  If you have any concerning symptoms such as fever nausea vomiting or worsening pain I recommend reevaluation.

## 2024-05-03 NOTE — ED PROVIDER NOTES
Tenderness: There is abdominal tenderness.   Musculoskeletal:         General: Normal range of motion.   Skin:     General: Skin is warm and dry.   Neurological:      Mental Status: She is alert and oriented to person, place, and time.         MEDICAL DECISION MAKING / ED COURSE:   Summary of Patient Presentation:        1)  Number and Complexity of Problems  Problem List This Visit: Suprapubic discomfort, hematuria, dysuria    Differential Diagnosis: UTI    Diagnoses Considered but Do Not Suspect: Obstructed ureteral stone, pyelonephritis    Pertinent Comorbid Conditions:  NA    2)  Data Reviewed    External data reviewed  CT abd/pelvis 4/6/24  FINDINGS:  Lower Chest: There is no airspace consolidation or pleural effusion.     Organs: The liver, gallbladder, spleen, adrenal glands and pancreas are  unremarkable.  There is a 2 mm stone at the lower pole the left kidney.  The  kidneys appear otherwise normal.  There is no ureteral stone or  hydronephrosis.     GI/Bowel: There is no bowel dilatation or bowel wall thickening.  The  appendix is retrocecal and appears normal.  The stomach is unremarkable.     Pelvis: Bladder is nearly empty and appears otherwise normal.  There is an  elongated 3 cm cyst in the left ovary.  There is a 1.9 cm cystic structure in  the right ovary.  There is no free fluid.     Peritoneum/Retroperitoneum: No evidence of lymphadenopathy.  Aorta is normal  in caliber.  No fat stranding, free fluid, free air or focal fluid collection  is identified.     Bones/Soft Tissues: No fracture or destructive bone lesion is identified.     IMPRESSION:  1. No ureteral stone or other acute finding in the abdomen or pelvis.  2. 3 cm left ovarian cyst which is elongated suggesting that it may be  involuting.  There is also a 1.9 cm cyst or dominant follicle in the right  ovary.  No follow-up imaging is recommended.  3. 2 mm left intrarenal calculus.     See more data below for the lab and radiology tests and

## 2024-05-03 NOTE — ED NOTES
Patient arrived to ED with c/o hematuria. Patient reported she went to the bathroom at 10:45 yesterday evening when she felt a sudden onsent of pain in her lower ABD and groin area. She reported when she urinated her urine was red in color, with clots of blood in her urine. Patient reported she was recently evaluated in the ED for a UTI, she completed her antibiotics of Keflex. Patient denies chest pain. Patient denies SOB, her respirations are equal, non-labored with no use of accessory muscles.     Call light within reach, warm blanket provided for comfort. Urine sent to lab.    Awaiting doctor orders.

## 2024-05-03 NOTE — ED NOTES
Patient ambulated to bath by self with no difficulties.     Patient's urine was red in color with small red flecks in her urine.

## 2024-05-04 LAB
MICROORGANISM SPEC CULT: ABNORMAL
SPECIMEN DESCRIPTION: ABNORMAL

## 2024-10-22 ENCOUNTER — OFFICE VISIT (OUTPATIENT)
Dept: OBGYN CLINIC | Age: 34
End: 2024-10-22

## 2024-10-22 VITALS
WEIGHT: 119 LBS | BODY MASS INDEX: 19.83 KG/M2 | SYSTOLIC BLOOD PRESSURE: 110 MMHG | DIASTOLIC BLOOD PRESSURE: 70 MMHG | HEIGHT: 65 IN

## 2024-10-22 DIAGNOSIS — N63.10 MASS OF RIGHT BREAST, UNSPECIFIED QUADRANT: Primary | ICD-10-CM

## 2024-10-22 PROCEDURE — 99212 OFFICE O/P EST SF 10 MIN: CPT | Performed by: OBSTETRICS & GYNECOLOGY

## 2024-10-22 NOTE — PATIENT INSTRUCTIONS
We will order a right diagnostic mammogram and ultrasound.  We will contact you with those results.  Please schedule an annual checkup after your mammogram/ultrasound.

## 2024-11-14 ENCOUNTER — HOSPITAL ENCOUNTER (OUTPATIENT)
Dept: ULTRASOUND IMAGING | Age: 34
Discharge: HOME | End: 2024-11-16

## 2024-11-14 ENCOUNTER — HOSPITAL ENCOUNTER (OUTPATIENT)
Dept: MAMMOGRAPHY | Age: 34
Discharge: HOME | End: 2024-11-16

## 2024-11-14 DIAGNOSIS — N63.0 MASS OF BREAST, UNSPECIFIED LATERALITY: ICD-10-CM

## 2024-11-14 PROCEDURE — 76642 ULTRASOUND BREAST LIMITED: CPT

## 2024-11-14 PROCEDURE — G0279 TOMOSYNTHESIS, MAMMO: HCPCS

## 2025-03-23 ENCOUNTER — APPOINTMENT (OUTPATIENT)
Dept: GENERAL RADIOLOGY | Age: 35
End: 2025-03-23
Payer: COMMERCIAL

## 2025-03-23 ENCOUNTER — HOSPITAL ENCOUNTER (EMERGENCY)
Age: 35
Discharge: HOME | End: 2025-03-23
Attending: EMERGENCY MEDICINE
Payer: COMMERCIAL

## 2025-03-23 ENCOUNTER — APPOINTMENT (OUTPATIENT)
Dept: CT IMAGING | Age: 35
End: 2025-03-23
Payer: COMMERCIAL

## 2025-03-23 VITALS
WEIGHT: 132 LBS | DIASTOLIC BLOOD PRESSURE: 63 MMHG | HEART RATE: 65 BPM | SYSTOLIC BLOOD PRESSURE: 106 MMHG | BODY MASS INDEX: 21.99 KG/M2 | HEIGHT: 65 IN | OXYGEN SATURATION: 96 % | TEMPERATURE: 98 F | RESPIRATION RATE: 15 BRPM

## 2025-03-23 DIAGNOSIS — R42 DIZZINESS: Primary | ICD-10-CM

## 2025-03-23 DIAGNOSIS — F41.1 ANXIETY STATE: ICD-10-CM

## 2025-03-23 DIAGNOSIS — R55 SYNCOPE AND COLLAPSE: ICD-10-CM

## 2025-03-23 LAB
ALBUMIN SERPL-MCNC: 4.2 G/DL (ref 3.5–5.2)
ALBUMIN/GLOB SERPL: 1.3 {RATIO} (ref 1–2.5)
ALP SERPL-CCNC: 51 U/L (ref 35–104)
ALT SERPL-CCNC: 67 U/L (ref 10–35)
ANION GAP SERPL CALCULATED.3IONS-SCNC: 11 MMOL/L (ref 9–16)
AST SERPL-CCNC: 26 U/L (ref 10–35)
BASOPHILS # BLD: 0.03 K/UL (ref 0–0.2)
BASOPHILS NFR BLD: 1 % (ref 0–2)
BILIRUB DIRECT SERPL-MCNC: 0.1 MG/DL (ref 0–0.2)
BILIRUB INDIRECT SERPL-MCNC: 0.2 MG/DL
BILIRUB SERPL-MCNC: 0.3 MG/DL (ref 0–1.2)
BILIRUB UR QL STRIP: NEGATIVE
BUN SERPL-MCNC: 11 MG/DL (ref 6–20)
CALCIUM SERPL-MCNC: 9.1 MG/DL (ref 8.6–10.4)
CHLORIDE SERPL-SCNC: 107 MMOL/L (ref 98–107)
CLARITY UR: CLEAR
CO2 SERPL-SCNC: 22 MMOL/L (ref 20–31)
COLOR UR: YELLOW
CREAT SERPL-MCNC: 0.6 MG/DL (ref 0.5–0.9)
EOSINOPHIL # BLD: 0.06 K/UL (ref 0–0.44)
EOSINOPHILS RELATIVE PERCENT: 1 % (ref 1–4)
EPI CELLS #/AREA URNS HPF: ABNORMAL /HPF (ref 0–5)
ERYTHROCYTE [DISTWIDTH] IN BLOOD BY AUTOMATED COUNT: 12.3 % (ref 11.8–14.4)
GFR, ESTIMATED: >90 ML/MIN/1.73M2
GLUCOSE SERPL-MCNC: 97 MG/DL (ref 74–99)
GLUCOSE UR STRIP-MCNC: NEGATIVE MG/DL
HCT VFR BLD AUTO: 37.3 % (ref 36.3–47.1)
HGB BLD-MCNC: 13.2 G/DL (ref 11.9–15.1)
HGB UR QL STRIP.AUTO: NEGATIVE
IMM GRANULOCYTES # BLD AUTO: 0.01 K/UL (ref 0–0.3)
IMM GRANULOCYTES NFR BLD: 0 %
KETONES UR STRIP-MCNC: NEGATIVE MG/DL
LEUKOCYTE ESTERASE UR QL STRIP: NEGATIVE
LYMPHOCYTES NFR BLD: 2.45 K/UL (ref 1.1–3.7)
LYMPHOCYTES RELATIVE PERCENT: 39 % (ref 24–43)
MAGNESIUM SERPL-MCNC: 2.1 MG/DL (ref 1.6–2.6)
MCH RBC QN AUTO: 32.4 PG (ref 25.2–33.5)
MCHC RBC AUTO-ENTMCNC: 35.4 G/DL (ref 28.4–34.8)
MCV RBC AUTO: 91.6 FL (ref 82.6–102.9)
MONOCYTES NFR BLD: 0.54 K/UL (ref 0.1–1.2)
MONOCYTES NFR BLD: 9 % (ref 3–12)
MYOGLOBIN SERPL-MCNC: <21 NG/ML (ref 25–58)
NEUTROPHILS NFR BLD: 50 % (ref 36–65)
NEUTS SEG NFR BLD: 3.2 K/UL (ref 1.5–8.1)
NITRITE UR QL STRIP: NEGATIVE
NRBC BLD-RTO: 0 PER 100 WBC
PH UR STRIP: 5.5 [PH] (ref 5–8)
PLATELET # BLD AUTO: 245 K/UL (ref 138–453)
PMV BLD AUTO: 10.4 FL (ref 8.1–13.5)
POTASSIUM SERPL-SCNC: 4 MMOL/L (ref 3.7–5.3)
PROT SERPL-MCNC: 7.4 G/DL (ref 6.6–8.7)
PROT UR STRIP-MCNC: NEGATIVE MG/DL
RBC # BLD AUTO: 4.07 M/UL (ref 3.95–5.11)
RBC #/AREA URNS HPF: ABNORMAL /HPF (ref 0–2)
SODIUM SERPL-SCNC: 140 MMOL/L (ref 136–145)
SP GR UR STRIP: 1.05 (ref 1–1.03)
TROPONIN I SERPL HS-MCNC: <6 NG/L (ref 0–14)
UROBILINOGEN UR STRIP-ACNC: NORMAL EU/DL (ref 0–1)
WBC #/AREA URNS HPF: ABNORMAL /HPF (ref 0–5)
WBC OTHER # BLD: 6.3 K/UL (ref 3.5–11.3)

## 2025-03-23 PROCEDURE — 83735 ASSAY OF MAGNESIUM: CPT

## 2025-03-23 PROCEDURE — 96374 THER/PROPH/DIAG INJ IV PUSH: CPT

## 2025-03-23 PROCEDURE — 71045 X-RAY EXAM CHEST 1 VIEW: CPT

## 2025-03-23 PROCEDURE — 71260 CT THORAX DX C+: CPT

## 2025-03-23 PROCEDURE — 80076 HEPATIC FUNCTION PANEL: CPT

## 2025-03-23 PROCEDURE — 85025 COMPLETE CBC W/AUTO DIFF WBC: CPT

## 2025-03-23 PROCEDURE — 81001 URINALYSIS AUTO W/SCOPE: CPT

## 2025-03-23 PROCEDURE — 99285 EMERGENCY DEPT VISIT HI MDM: CPT

## 2025-03-23 PROCEDURE — 6360000004 HC RX CONTRAST MEDICATION: Performed by: NURSE PRACTITIONER

## 2025-03-23 PROCEDURE — 2500000003 HC RX 250 WO HCPCS: Performed by: NURSE PRACTITIONER

## 2025-03-23 PROCEDURE — 83874 ASSAY OF MYOGLOBIN: CPT

## 2025-03-23 PROCEDURE — 93005 ELECTROCARDIOGRAM TRACING: CPT | Performed by: EMERGENCY MEDICINE

## 2025-03-23 PROCEDURE — 2580000003 HC RX 258: Performed by: NURSE PRACTITIONER

## 2025-03-23 PROCEDURE — 6370000000 HC RX 637 (ALT 250 FOR IP): Performed by: NURSE PRACTITIONER

## 2025-03-23 PROCEDURE — 70450 CT HEAD/BRAIN W/O DYE: CPT

## 2025-03-23 PROCEDURE — 6360000002 HC RX W HCPCS: Performed by: NURSE PRACTITIONER

## 2025-03-23 PROCEDURE — 80048 BASIC METABOLIC PNL TOTAL CA: CPT

## 2025-03-23 PROCEDURE — 84484 ASSAY OF TROPONIN QUANT: CPT

## 2025-03-23 RX ORDER — 0.9 % SODIUM CHLORIDE 0.9 %
1000 INTRAVENOUS SOLUTION INTRAVENOUS ONCE
Status: COMPLETED | OUTPATIENT
Start: 2025-03-23 | End: 2025-03-23

## 2025-03-23 RX ORDER — 0.9 % SODIUM CHLORIDE 0.9 %
100 INTRAVENOUS SOLUTION INTRAVENOUS ONCE
Status: COMPLETED | OUTPATIENT
Start: 2025-03-23 | End: 2025-03-23

## 2025-03-23 RX ORDER — IOPAMIDOL 755 MG/ML
75 INJECTION, SOLUTION INTRAVASCULAR
Status: COMPLETED | OUTPATIENT
Start: 2025-03-23 | End: 2025-03-23

## 2025-03-23 RX ORDER — SODIUM CHLORIDE 0.9 % (FLUSH) 0.9 %
10 SYRINGE (ML) INJECTION PRN
Status: DISCONTINUED | OUTPATIENT
Start: 2025-03-23 | End: 2025-03-23 | Stop reason: HOSPADM

## 2025-03-23 RX ORDER — MECLIZINE HYDROCHLORIDE 25 MG/1
25 TABLET ORAL 3 TIMES DAILY PRN
Qty: 15 TABLET | Refills: 0 | Status: SHIPPED | OUTPATIENT
Start: 2025-03-23

## 2025-03-23 RX ORDER — MECLIZINE HCL 12.5 MG 12.5 MG/1
25 TABLET ORAL ONCE
Status: COMPLETED | OUTPATIENT
Start: 2025-03-23 | End: 2025-03-23

## 2025-03-23 RX ORDER — LORAZEPAM 2 MG/ML
1 INJECTION INTRAMUSCULAR ONCE
Status: COMPLETED | OUTPATIENT
Start: 2025-03-23 | End: 2025-03-23

## 2025-03-23 RX ADMIN — SODIUM CHLORIDE, PRESERVATIVE FREE 10 ML: 5 INJECTION INTRAVENOUS at 13:08

## 2025-03-23 RX ADMIN — MECLIZINE 25 MG: 12.5 TABLET ORAL at 15:05

## 2025-03-23 RX ADMIN — SODIUM CHLORIDE 100 ML: 9 INJECTION, SOLUTION INTRAVENOUS at 13:08

## 2025-03-23 RX ADMIN — SODIUM CHLORIDE 1000 ML: 9 INJECTION, SOLUTION INTRAVENOUS at 12:10

## 2025-03-23 RX ADMIN — Medication 1 MG: at 13:44

## 2025-03-23 RX ADMIN — IOPAMIDOL 75 ML: 755 INJECTION, SOLUTION INTRAVENOUS at 13:08

## 2025-03-23 RX ADMIN — SODIUM CHLORIDE 1000 ML: 0.9 INJECTION, SOLUTION INTRAVENOUS at 15:05

## 2025-03-23 ASSESSMENT — LIFESTYLE VARIABLES
HOW MANY STANDARD DRINKS CONTAINING ALCOHOL DO YOU HAVE ON A TYPICAL DAY: PATIENT DOES NOT DRINK
HOW OFTEN DO YOU HAVE A DRINK CONTAINING ALCOHOL: NEVER

## 2025-03-23 ASSESSMENT — PAIN - FUNCTIONAL ASSESSMENT: PAIN_FUNCTIONAL_ASSESSMENT: NONE - DENIES PAIN

## 2025-03-23 NOTE — ED NOTES
Pt reports chest pain that started during CT scan, states she is worried about having a panic attack. Arabella SARAH made aware.

## 2025-03-23 NOTE — ED NOTES
Pt presenting to the ED with complaints of dizziness. Pt reports that she recently stopped taking her antidepressants.

## 2025-03-23 NOTE — ED PROVIDER NOTES
[facility] EMERGENCY DEPARTMENT  eMERGENCY dEPARTMENT eNCOUnter      Pt Name: Sarah Cee  MRN: [id number]  Birthdate 1990  Date of evaluation: 3/23/2025  Provider: KEERTHI Swan CNP    CHIEF COMPLAINT       Chief Complaint   Patient presents with    Dizziness     Intermittent x2 weeks with chest pain and syncope. Has recently stopped psych meds because she thought this was contributory but sx persist. Hx of anxiety and family hx significant for young age cardiac disease         HISTORY OF PRESENT ILLNESS  (Location/Symptom, Timing/Onset, Context/Setting, Quality, Duration, Modifying Factors, Severity.)   Sarah Cee is a 34 y.o. female who presents to the emergency department. C/o dizziness episodes x2 weeks. Symptoms have been intermittent. Reports spinning at times with other episodes of feeling lightheaded. Reports several syncopal episodes that have caused her to fall; she wakes up right away. Denies injury with the falls. Denies headache, vision changes, CP. Reports episodes of SOB. Denies pain to her neck, back, abdomen. Denies N/V/D, urinary sx. Rates her pain 0/10. Denies recent travel or surgery. Denies hormone use. States she stopped taking all of her medications, including her anxiety medication, after her symptoms started; she felt her medication may have been contributing to her symptoms.      Nursing Notes were reviewed.    ALLERGIES     Morphine    CURRENT MEDICATIONS       Previous Medications    No medications on file       PAST MEDICAL HISTORY         Diagnosis Date    Bradycardia     Cancer (HCC)     Cervical cancer (HCC)     Chronic back pain     LGSIL of cervix of undetermined significance 04/04/2017    HPV+    Menorrhagia     PONV (postoperative nausea and vomiting)     Prolonged emergence from general anesthesia     Uterine perforation 08/2017    history of during laparoscopy    Vasodepressor syncope     cardiac eval 8/2017 (inpatient at [facility], notes in

## 2025-03-24 LAB
EKG ATRIAL RATE: 65 BPM
EKG P AXIS: 64 DEGREES
EKG P-R INTERVAL: 146 MS
EKG Q-T INTERVAL: 386 MS
EKG QRS DURATION: 64 MS
EKG QTC CALCULATION (BAZETT): 401 MS
EKG R AXIS: 56 DEGREES
EKG T AXIS: 30 DEGREES
EKG VENTRICULAR RATE: 65 BPM

## 2025-03-24 PROCEDURE — 93010 ELECTROCARDIOGRAM REPORT: CPT | Performed by: INTERNAL MEDICINE

## (undated) DEVICE — SUTURE ABSORBABLE BRAIDED 0 CT-1 8X27 IN UD VICRYL JJ41G

## (undated) DEVICE — TUBING, SUCTION, 9/32" X 20', STRAIGHT: Brand: MEDLINE INDUSTRIES, INC.

## (undated) DEVICE — YANKAUER,FLEXIBLE HANDLE,REGLR CAPACITY: Brand: MEDLINE INDUSTRIES, INC.

## (undated) DEVICE — MERCY HEALTH ST CHARLES: Brand: MEDLINE INDUSTRIES, INC.

## (undated) DEVICE — NEEDLE SPNL 22GA L3.5IN BLK HUB S STL REG WALL FIT STYL W/

## (undated) DEVICE — GLOVE ORANGE PI 7   MSG9070

## (undated) DEVICE — SYRINGE IRRIG 60ML SFT PLIABLE BLB EZ TO GRP 1 HND USE W/

## (undated) DEVICE — SUTURE VCRL SZ 3-0 L27IN ABSRB UD L26MM SH 1/2 CIR J416H

## (undated) DEVICE — GLOVE SURG SZ 65 THK91MIL LTX FREE SYN POLYISOPRENE

## (undated) DEVICE — KENDALL SCD EXPRESS SLEEVES, KNEE LENGTH, MEDIUM: Brand: KENDALL SCD

## (undated) DEVICE — NEEDLE HYPO 18GA L1.5IN PNK S STL HUB POLYPR SHLD REG BVL

## (undated) DEVICE — SUTURE ABSORBABLE BRAIDED 2-0 CT-1 27 IN UD VICRYL J259H

## (undated) DEVICE — TRAY CATHETER 16FR F INCLUDE BARDX IC COMPLT CARE DRNGE BG

## (undated) DEVICE — SINGLE PORT MANIFOLD: Brand: NEPTUNE 2

## (undated) DEVICE — SVMMC GYN MIN PK

## (undated) DEVICE — PREP SOL PVP IODINE 4%  4 OZ/BTL

## (undated) DEVICE — SOLUTION SURG PREP POV IOD 7.5% 4 OZ

## (undated) DEVICE — INTENDED FOR TISSUE SEPARATION, AND OTHER PROCEDURES THAT REQUIRE A SHARP SURGICAL BLADE TO PUNCTURE OR CUT.: Brand: BARD-PARKER ® CARBON RIB-BACK BLADES

## (undated) DEVICE — SUTURE VCRL SZ 0 L27IN ABSRB UD L36MM CT-1 1/2 CIR J260H

## (undated) DEVICE — NEEDLE SPNL L3.5IN PNK HUB S STL REG WALL FIT STYL W/ QNCKE

## (undated) DEVICE — DEFENDO AIR WATER SUCTION AND BIOPSY VALVE KIT FOR  OLYMPUS: Brand: DEFENDO AIR/WATER/SUCTION AND BIOPSY VALVE

## (undated) DEVICE — 1840 FOAM BLOCK NEEDLE COUNTER: Brand: DEVON

## (undated) DEVICE — SYRINGE, LUER LOCK, 10ML: Brand: MEDLINE

## (undated) DEVICE — CRANIOTOMY DRAPE, STERILE: Brand: MEDLINE

## (undated) DEVICE — GLOVE ORANGE PI 7 1/2   MSG9075

## (undated) DEVICE — CONTROL SYRINGE LUER-LOCK TIP: Brand: MONOJECT

## (undated) DEVICE — ENDO KIT W/SYRINGE: Brand: MEDLINE INDUSTRIES, INC.

## (undated) DEVICE — HYPODERMIC SAFETY NEEDLE: Brand: MAGELLAN

## (undated) DEVICE — PENCIL ES L3M BTTN SWCH HOLSTER W/ BLDE ELECTRD EDGE

## (undated) DEVICE — GOWN,AURORA,NONREINFORCED,LARGE: Brand: MEDLINE

## (undated) DEVICE — CONTAINER,SPECIMEN,4OZ,OR STRL: Brand: MEDLINE